# Patient Record
Sex: MALE | Race: BLACK OR AFRICAN AMERICAN | NOT HISPANIC OR LATINO | Employment: UNEMPLOYED | URBAN - METROPOLITAN AREA
[De-identification: names, ages, dates, MRNs, and addresses within clinical notes are randomized per-mention and may not be internally consistent; named-entity substitution may affect disease eponyms.]

---

## 2021-08-27 ENCOUNTER — PREP FOR SURGERY (OUTPATIENT)
Dept: OTHER | Facility: HOSPITAL | Age: 66
End: 2021-08-27

## 2021-08-27 ENCOUNTER — OFFICE VISIT (OUTPATIENT)
Dept: NEUROSURGERY | Facility: CLINIC | Age: 66
End: 2021-08-27

## 2021-08-27 VITALS
HEART RATE: 69 BPM | DIASTOLIC BLOOD PRESSURE: 106 MMHG | WEIGHT: 181 LBS | SYSTOLIC BLOOD PRESSURE: 162 MMHG | BODY MASS INDEX: 29.09 KG/M2 | HEIGHT: 66 IN

## 2021-08-27 DIAGNOSIS — M50.30 DDD (DEGENERATIVE DISC DISEASE), CERVICAL: ICD-10-CM

## 2021-08-27 DIAGNOSIS — M48.02 CERVICAL STENOSIS OF SPINE: ICD-10-CM

## 2021-08-27 DIAGNOSIS — M54.12 CERVICAL RADICULOPATHY: Primary | ICD-10-CM

## 2021-08-27 DIAGNOSIS — M50.20 CERVICAL DISC HERNIATION: ICD-10-CM

## 2021-08-27 PROCEDURE — 99205 OFFICE O/P NEW HI 60 MIN: CPT | Performed by: NEUROLOGICAL SURGERY

## 2021-08-27 RX ORDER — BUDESONIDE AND FORMOTEROL FUMARATE DIHYDRATE 160; 4.5 UG/1; UG/1
2 AEROSOL RESPIRATORY (INHALATION)
COMMUNITY

## 2021-08-27 RX ORDER — DORZOLAMIDE HCL 20 MG/ML
1 SOLUTION/ DROPS OPHTHALMIC 3 TIMES DAILY
COMMUNITY

## 2021-08-27 RX ORDER — LISINOPRIL 40 MG/1
40 TABLET ORAL DAILY
COMMUNITY

## 2021-08-27 RX ORDER — OMEPRAZOLE 20 MG/1
20 CAPSULE, DELAYED RELEASE ORAL DAILY
COMMUNITY

## 2021-08-27 RX ORDER — ALBUTEROL SULFATE 90 UG/1
2 AEROSOL, METERED RESPIRATORY (INHALATION) EVERY 4 HOURS PRN
COMMUNITY

## 2021-08-27 RX ORDER — CYANOCOBALAMIN 1000 UG/ML
1000 INJECTION, SOLUTION INTRAMUSCULAR; SUBCUTANEOUS
COMMUNITY

## 2021-08-27 RX ORDER — HYDROCHLOROTHIAZIDE 25 MG/1
25 TABLET ORAL DAILY
COMMUNITY

## 2021-08-27 NOTE — PROGRESS NOTES
Subjective   History of Present Illness: Justo Finnegan is a 65 y.o. male with a months to years history of neck pain and upper extremity pain that is progressed over the course of time.  Patient said that he began noticing weakness primarily in his right upper extremity as well as numbness.  He said approximately month ago he noticed that he was having severe difficulty lifting anything with his right upper extremity due to the weakness.  He notes severe weakness in his right upper extremity primarily with lifting his arm using his bicep and tricep.  He has also noticed some weakness in the left side but much less severe.  He has also noticed worsening numbness in his arms and hands bilaterally.  Additionally patient has pain that shoots from his neck down his arm and into his hand.  He is unclear on exactly where the pain goes in his hand.  This happens bilaterally.  Worse on the right.  Patient is undergone physical therapy but he feels it made things worse.  No injections.  No bowel or bladder issues.  Patient denies any lower extremity issues or balance issues.        The following portions of the patient's history were reviewed and updated as appropriate: allergies, current medications, past family history, past medical history, past social history, past surgical history and problem list.    Review of Systems   Constitutional: Negative for chills, fatigue and fever.   HENT: Negative for congestion, postnasal drip, sinus pressure and sinus pain.    Eyes: Negative for photophobia, pain and visual disturbance.   Respiratory: Negative for cough, chest tightness, shortness of breath and wheezing.    Cardiovascular: Negative for chest pain and palpitations.   Gastrointestinal: Negative for abdominal pain, constipation, diarrhea and nausea.   Genitourinary: Negative for difficulty urinating, flank pain and testicular pain.   Musculoskeletal: Positive for neck pain and neck stiffness.   Skin: Negative for rash.    Neurological: Negative for seizures, syncope and speech difficulty.   Psychiatric/Behavioral: Negative for agitation, behavioral problems and confusion.       Objective     .There were no vitals taken for this visit.   There is no height or weight on file to calculate BMI.      Neurologic Exam     Mental Status   Oriented to person, place, and time.     Cranial Nerves   Extraocular movements intact  No tongue fasciculations     Motor Exam     Strength   Strength 5/5 except as noted. Left upper extremity: 4+ deltoid, 4+ bicep  Right upper extremity: 4 deltoid, 4 - bicep, 4 tricep, 4 wrist extension, mild decrease in  strength     Sensory Exam   Decreased sensation C5, 6 and 7 dermatomes right   Decreased sensation C5 and C6 dermatomes left     Gait, Coordination, and Reflexes     Reflexes   Right biceps: 0  Right triceps: 0  Right Bradford: absent  Left Bradford: absent      Assessment/Plan   Independent Review of Radiographic Studies:      I personally reviewed and interpreted the images from the following studies.    MRI cervical spine: Moderate to severe degenerative changes from C4-C7 with severe foraminal stenosis bilaterally right worse than left.  Mild to moderate central stenosis C4-6, moderate central stenosis C6 7.      Cervical x-ray: Redemonstration of degenerative disc disease.  No spondylolisthesis    Medical Decision Making:      Justo Finnegan is a 65 y.o. male with a history of progressive radicular symptoms that have significantly worsened over the course of the last several months to the point that he is severely weak in involved myotomes of the right upper extremity.  MRI correlates well with his symptoms given the severe foraminal stenosis with nerve compression of the C5, C6 and C7 nerve roots.  Given the patient's severe radiculopathy and weakness, surgery is indicated and we will proceed as soon as possible given his progressive weakness due to nerve compression.  Specifically, I  recommend a C4-C7 ACDF.  Patient understands our significant risks of surgery including spinal cord injury or pseudoarthrosis.  However given his progressive weakness, benefits outweigh the risks.      There are no diagnoses linked to this encounter.  No follow-ups on file.    This patient was examined wearing appropriate personal protective equipment.     Justo Finnegan  has no history on file for tobacco use.. I have educated him on the risk of diseases from using tobacco products such as cancer, COPD and heart disease.     I advised him to quit and he is willing to quit. We have discussed the following method/s for tobacco cessation:  [place].  Together we have set a quit date for Today in order to proceed with surgery.    I spent 5 minutes counseling the patient.                  Wes Mazariegos MA  08/27/21  08:10 EDT

## 2021-08-30 PROBLEM — M54.12 CERVICAL RADICULOPATHY: Status: ACTIVE | Noted: 2021-08-30

## 2021-08-30 RX ORDER — NAPROXEN SODIUM 220 MG
220 TABLET ORAL 2 TIMES DAILY PRN
Status: ON HOLD | COMMUNITY
End: 2021-09-07

## 2021-08-31 ENCOUNTER — HOSPITAL ENCOUNTER (OUTPATIENT)
Dept: GENERAL RADIOLOGY | Facility: HOSPITAL | Age: 66
Discharge: HOME | End: 2021-08-31

## 2021-08-31 ENCOUNTER — LAB (OUTPATIENT)
Dept: LAB | Facility: HOSPITAL | Age: 66
End: 2021-08-31

## 2021-08-31 ENCOUNTER — HOSPITAL ENCOUNTER (OUTPATIENT)
Dept: CARDIOLOGY | Facility: HOSPITAL | Age: 66
Discharge: HOME | End: 2021-08-31

## 2021-08-31 DIAGNOSIS — M54.12 CERVICAL RADICULOPATHY: ICD-10-CM

## 2021-08-31 LAB
ABO GROUP BLD: NORMAL
ANION GAP SERPL CALCULATED.3IONS-SCNC: 10.5 MMOL/L (ref 5–15)
BASOPHILS # BLD AUTO: 0.1 10*3/MM3 (ref 0–0.2)
BASOPHILS NFR BLD AUTO: 1.5 % (ref 0–1.5)
BILIRUB UR QL STRIP: NEGATIVE
BLD GP AB SCN SERPL QL: NEGATIVE
BUN SERPL-MCNC: 12 MG/DL (ref 8–23)
BUN/CREAT SERPL: 9.4 (ref 7–25)
CALCIUM SPEC-SCNC: 9.4 MG/DL (ref 8.6–10.5)
CHLORIDE SERPL-SCNC: 98 MMOL/L (ref 98–107)
CLARITY UR: CLEAR
CO2 SERPL-SCNC: 28.5 MMOL/L (ref 22–29)
COLOR UR: YELLOW
CREAT SERPL-MCNC: 1.28 MG/DL (ref 0.76–1.27)
DEPRECATED RDW RBC AUTO: 37.4 FL (ref 37–54)
EOSINOPHIL # BLD AUTO: 0.14 10*3/MM3 (ref 0–0.4)
EOSINOPHIL NFR BLD AUTO: 2.2 % (ref 0.3–6.2)
ERYTHROCYTE [DISTWIDTH] IN BLOOD BY AUTOMATED COUNT: 12.8 % (ref 12.3–15.4)
GFR SERPL CREATININE-BSD FRML MDRD: 69 ML/MIN/1.73
GLUCOSE SERPL-MCNC: 111 MG/DL (ref 65–99)
GLUCOSE UR STRIP-MCNC: NEGATIVE MG/DL
HCT VFR BLD AUTO: 51.1 % (ref 37.5–51)
HGB BLD-MCNC: 17.9 G/DL (ref 13–17.7)
HGB UR QL STRIP.AUTO: NEGATIVE
IMM GRANULOCYTES # BLD AUTO: 0.02 10*3/MM3 (ref 0–0.05)
IMM GRANULOCYTES NFR BLD AUTO: 0.3 % (ref 0–0.5)
INR PPP: 1.03 (ref 0.93–1.1)
KETONES UR QL STRIP: NEGATIVE
LEUKOCYTE ESTERASE UR QL STRIP.AUTO: NEGATIVE
LYMPHOCYTES # BLD AUTO: 1.96 10*3/MM3 (ref 0.7–3.1)
LYMPHOCYTES NFR BLD AUTO: 30.3 % (ref 19.6–45.3)
MCH RBC QN AUTO: 28.5 PG (ref 26.6–33)
MCHC RBC AUTO-ENTMCNC: 35 G/DL (ref 31.5–35.7)
MCV RBC AUTO: 81.4 FL (ref 79–97)
MONOCYTES # BLD AUTO: 0.73 10*3/MM3 (ref 0.1–0.9)
MONOCYTES NFR BLD AUTO: 11.3 % (ref 5–12)
MRSA DNA SPEC QL NAA+PROBE: NORMAL
NEUTROPHILS NFR BLD AUTO: 3.51 10*3/MM3 (ref 1.7–7)
NEUTROPHILS NFR BLD AUTO: 54.4 % (ref 42.7–76)
NITRITE UR QL STRIP: NEGATIVE
NRBC BLD AUTO-RTO: 0 /100 WBC (ref 0–0.2)
PH UR STRIP.AUTO: 6 [PH] (ref 5–8)
PLATELET # BLD AUTO: 314 10*3/MM3 (ref 140–450)
PMV BLD AUTO: 9.5 FL (ref 6–12)
POTASSIUM SERPL-SCNC: 3.9 MMOL/L (ref 3.5–5.2)
PROT UR QL STRIP: NEGATIVE
PROTHROMBIN TIME: 11.4 SECONDS (ref 9.6–11.7)
QT INTERVAL: 396 MS
RBC # BLD AUTO: 6.28 10*6/MM3 (ref 4.14–5.8)
RH BLD: POSITIVE
SODIUM SERPL-SCNC: 137 MMOL/L (ref 136–145)
SP GR UR STRIP: 1.02 (ref 1–1.03)
T&S EXPIRATION DATE: NORMAL
UROBILINOGEN UR QL STRIP: NORMAL
WBC # BLD AUTO: 6.46 10*3/MM3 (ref 3.4–10.8)

## 2021-08-31 PROCEDURE — 80048 BASIC METABOLIC PNL TOTAL CA: CPT

## 2021-08-31 PROCEDURE — 85610 PROTHROMBIN TIME: CPT

## 2021-08-31 PROCEDURE — 93010 ELECTROCARDIOGRAM REPORT: CPT | Performed by: INTERNAL MEDICINE

## 2021-08-31 PROCEDURE — 86850 RBC ANTIBODY SCREEN: CPT

## 2021-08-31 PROCEDURE — 86900 BLOOD TYPING SEROLOGIC ABO: CPT

## 2021-08-31 PROCEDURE — 86901 BLOOD TYPING SEROLOGIC RH(D): CPT

## 2021-08-31 PROCEDURE — 93005 ELECTROCARDIOGRAM TRACING: CPT | Performed by: NEUROLOGICAL SURGERY

## 2021-08-31 PROCEDURE — 71046 X-RAY EXAM CHEST 2 VIEWS: CPT

## 2021-08-31 PROCEDURE — 36415 COLL VENOUS BLD VENIPUNCTURE: CPT

## 2021-08-31 PROCEDURE — 87641 MR-STAPH DNA AMP PROBE: CPT

## 2021-08-31 PROCEDURE — 81003 URINALYSIS AUTO W/O SCOPE: CPT

## 2021-08-31 PROCEDURE — 85025 COMPLETE CBC W/AUTO DIFF WBC: CPT

## 2021-09-07 ENCOUNTER — ANESTHESIA (OUTPATIENT)
Dept: PERIOP | Facility: HOSPITAL | Age: 66
End: 2021-09-07

## 2021-09-07 ENCOUNTER — HOSPITAL ENCOUNTER (OUTPATIENT)
Facility: HOSPITAL | Age: 66
Discharge: HOME | End: 2021-09-08
Attending: NEUROLOGICAL SURGERY | Admitting: HOSPITALIST

## 2021-09-07 ENCOUNTER — ANESTHESIA EVENT (OUTPATIENT)
Dept: PERIOP | Facility: HOSPITAL | Age: 66
End: 2021-09-07

## 2021-09-07 ENCOUNTER — APPOINTMENT (OUTPATIENT)
Dept: GENERAL RADIOLOGY | Facility: HOSPITAL | Age: 66
End: 2021-09-07

## 2021-09-07 ENCOUNTER — LAB (OUTPATIENT)
Dept: LAB | Facility: HOSPITAL | Age: 66
End: 2021-09-07

## 2021-09-07 DIAGNOSIS — M54.12 CERVICAL RADICULOPATHY: ICD-10-CM

## 2021-09-07 LAB — GLUCOSE BLDC GLUCOMTR-MCNC: 105 MG/DL (ref 70–105)

## 2021-09-07 PROCEDURE — 25010000002 HYDROMORPHONE PER 4 MG: Performed by: ANESTHESIOLOGY

## 2021-09-07 PROCEDURE — 25010000002 SUCCINYLCHOLINE PER 20 MG: Performed by: NURSE ANESTHETIST, CERTIFIED REGISTERED

## 2021-09-07 PROCEDURE — 25010000002 DEXAMETHASONE PER 1 MG: Performed by: NURSE ANESTHETIST, CERTIFIED REGISTERED

## 2021-09-07 PROCEDURE — C1713 ANCHOR/SCREW BN/BN,TIS/BN: HCPCS | Performed by: NEUROLOGICAL SURGERY

## 2021-09-07 PROCEDURE — G0378 HOSPITAL OBSERVATION PER HR: HCPCS

## 2021-09-07 PROCEDURE — 25010000002 CYANOCOBALAMIN PER 1000 MCG: Performed by: NEUROLOGICAL SURGERY

## 2021-09-07 PROCEDURE — 99214 OFFICE O/P EST MOD 30 MIN: CPT | Performed by: HOSPITALIST

## 2021-09-07 PROCEDURE — 82962 GLUCOSE BLOOD TEST: CPT

## 2021-09-07 PROCEDURE — 25010000002 FENTANYL CITRATE (PF) 100 MCG/2ML SOLUTION: Performed by: NURSE ANESTHETIST, CERTIFIED REGISTERED

## 2021-09-07 PROCEDURE — C1889 IMPLANT/INSERT DEVICE, NOC: HCPCS | Performed by: NEUROLOGICAL SURGERY

## 2021-09-07 PROCEDURE — 25010000002 HYDROMORPHONE PER 4 MG: Performed by: NEUROLOGICAL SURGERY

## 2021-09-07 PROCEDURE — 72040 X-RAY EXAM NECK SPINE 2-3 VW: CPT

## 2021-09-07 PROCEDURE — 22845 INSERT SPINE FIXATION DEVICE: CPT | Performed by: NEUROLOGICAL SURGERY

## 2021-09-07 PROCEDURE — 22551 ARTHRD ANT NTRBDY CERVICAL: CPT | Performed by: NEUROLOGICAL SURGERY

## 2021-09-07 PROCEDURE — 25010000002 HYDROMORPHONE PER 4 MG: Performed by: NURSE ANESTHETIST, CERTIFIED REGISTERED

## 2021-09-07 PROCEDURE — 22853 INSJ BIOMECHANICAL DEVICE: CPT | Performed by: NEUROLOGICAL SURGERY

## 2021-09-07 PROCEDURE — 25010000002 HYDRALAZINE PER 20 MG: Performed by: NURSE ANESTHETIST, CERTIFIED REGISTERED

## 2021-09-07 PROCEDURE — 25010000002 ONDANSETRON PER 1 MG: Performed by: ANESTHESIOLOGY

## 2021-09-07 PROCEDURE — 22845 INSERT SPINE FIXATION DEVICE: CPT | Performed by: SPECIALIST/TECHNOLOGIST, OTHER

## 2021-09-07 PROCEDURE — 22853 INSJ BIOMECHANICAL DEVICE: CPT | Performed by: SPECIALIST/TECHNOLOGIST, OTHER

## 2021-09-07 PROCEDURE — 94799 UNLISTED PULMONARY SVC/PX: CPT

## 2021-09-07 PROCEDURE — 25010000002 METHYLPREDNISOLONE PER 80 MG: Performed by: NEUROLOGICAL SURGERY

## 2021-09-07 PROCEDURE — 25010000002 HYDRALAZINE PER 20 MG: Performed by: NEUROLOGICAL SURGERY

## 2021-09-07 PROCEDURE — 22552 ARTHRD ANT NTRBD CERVICAL EA: CPT | Performed by: SPECIALIST/TECHNOLOGIST, OTHER

## 2021-09-07 PROCEDURE — 25010000002 PROPOFOL 10 MG/ML EMULSION: Performed by: NURSE ANESTHETIST, CERTIFIED REGISTERED

## 2021-09-07 PROCEDURE — 76000 FLUOROSCOPY <1 HR PHYS/QHP: CPT

## 2021-09-07 PROCEDURE — 22552 ARTHRD ANT NTRBD CERVICAL EA: CPT | Performed by: NEUROLOGICAL SURGERY

## 2021-09-07 PROCEDURE — 22551 ARTHRD ANT NTRBDY CERVICAL: CPT | Performed by: SPECIALIST/TECHNOLOGIST, OTHER

## 2021-09-07 DEVICE — ALLOGRFT FIBR OSTEOAMP SELECT 2.5CC: Type: IMPLANTABLE DEVICE | Site: SPINE CERVICAL | Status: FUNCTIONAL

## 2021-09-07 DEVICE — FLOSEAL HEMOSTATIC MATRIX, 10ML
Type: IMPLANTABLE DEVICE | Site: SPINE CERVICAL | Status: FUNCTIONAL
Brand: FLOSEAL HEMOSTATIC MATRIX

## 2021-09-07 DEVICE — HEDRON C SPACER, 14X16, 7MM, 7°
Type: IMPLANTABLE DEVICE | Site: SPINE CERVICAL | Status: FUNCTIONAL
Brand: HEDRON

## 2021-09-07 DEVICE — XTEND 4.2MM FIXED ANGLE SCREW, SELF-DRILLING, 14MM
Type: IMPLANTABLE DEVICE | Site: SPINE CERVICAL | Status: FUNCTIONAL
Brand: XTEND

## 2021-09-07 DEVICE — IMPLANTABLE DEVICE: Type: IMPLANTABLE DEVICE | Site: SPINE CERVICAL | Status: FUNCTIONAL

## 2021-09-07 DEVICE — VIP 4.6MM FIXED ANGLE SCREW, SELF-DRILLING, 14MM
Type: IMPLANTABLE DEVICE | Site: SPINE CERVICAL | Status: FUNCTIONAL
Brand: VIP

## 2021-09-07 DEVICE — I-FACTOR PUTTY, 2.5CC SYRINGE
Type: IMPLANTABLE DEVICE | Site: SPINE CERVICAL | Status: FUNCTIONAL
Brand: I-FACTOR PEPTIDE ENHANCED BONE GRAFT

## 2021-09-07 DEVICE — XTEND ANTERIOR CERVICAL PLATE, 3-LEVEL, 48MM
Type: IMPLANTABLE DEVICE | Site: SPINE CERVICAL | Status: FUNCTIONAL
Brand: XTEND

## 2021-09-07 RX ORDER — METHYLPREDNISOLONE ACETATE 80 MG/ML
INJECTION, SUSPENSION INTRA-ARTICULAR; INTRALESIONAL; INTRAMUSCULAR; SOFT TISSUE AS NEEDED
Status: DISCONTINUED | OUTPATIENT
Start: 2021-09-07 | End: 2021-09-07 | Stop reason: HOSPADM

## 2021-09-07 RX ORDER — SODIUM CHLORIDE 0.9 % (FLUSH) 0.9 %
10 SYRINGE (ML) INJECTION AS NEEDED
Status: DISCONTINUED | OUTPATIENT
Start: 2021-09-07 | End: 2021-09-07 | Stop reason: HOSPADM

## 2021-09-07 RX ORDER — EPHEDRINE SULFATE 50 MG/ML
INJECTION INTRAVENOUS AS NEEDED
Status: DISCONTINUED | OUTPATIENT
Start: 2021-09-07 | End: 2021-09-07 | Stop reason: SURG

## 2021-09-07 RX ORDER — HYDRALAZINE HYDROCHLORIDE 20 MG/ML
5 INJECTION INTRAMUSCULAR; INTRAVENOUS
Status: DISCONTINUED | OUTPATIENT
Start: 2021-09-07 | End: 2021-09-08 | Stop reason: HOSPADM

## 2021-09-07 RX ORDER — HYDROMORPHONE HCL 110MG/55ML
PATIENT CONTROLLED ANALGESIA SYRINGE INTRAVENOUS AS NEEDED
Status: DISCONTINUED | OUTPATIENT
Start: 2021-09-07 | End: 2021-09-07 | Stop reason: SURG

## 2021-09-07 RX ORDER — ALBUTEROL SULFATE 90 UG/1
2 AEROSOL, METERED RESPIRATORY (INHALATION) EVERY 4 HOURS PRN
Status: DISCONTINUED | OUTPATIENT
Start: 2021-09-07 | End: 2021-09-08 | Stop reason: HOSPADM

## 2021-09-07 RX ORDER — LIDOCAINE HYDROCHLORIDE 20 MG/ML
INJECTION, SOLUTION EPIDURAL; INFILTRATION; INTRACAUDAL; PERINEURAL AS NEEDED
Status: DISCONTINUED | OUTPATIENT
Start: 2021-09-07 | End: 2021-09-07 | Stop reason: SURG

## 2021-09-07 RX ORDER — ONDANSETRON 2 MG/ML
INJECTION INTRAMUSCULAR; INTRAVENOUS AS NEEDED
Status: DISCONTINUED | OUTPATIENT
Start: 2021-09-07 | End: 2021-09-07 | Stop reason: SURG

## 2021-09-07 RX ORDER — SODIUM CHLORIDE 9 MG/ML
100 INJECTION, SOLUTION INTRAVENOUS CONTINUOUS
Status: DISCONTINUED | OUTPATIENT
Start: 2021-09-07 | End: 2021-09-08 | Stop reason: HOSPADM

## 2021-09-07 RX ORDER — CYCLOBENZAPRINE HCL 10 MG
10 TABLET ORAL 3 TIMES DAILY PRN
Status: DISCONTINUED | OUTPATIENT
Start: 2021-09-07 | End: 2021-09-08 | Stop reason: HOSPADM

## 2021-09-07 RX ORDER — PROMETHAZINE HYDROCHLORIDE 25 MG/1
25 TABLET ORAL ONCE AS NEEDED
Status: COMPLETED | OUTPATIENT
Start: 2021-09-07 | End: 2021-09-08

## 2021-09-07 RX ORDER — CELECOXIB 200 MG/1
200 CAPSULE ORAL ONCE
Status: COMPLETED | OUTPATIENT
Start: 2021-09-07 | End: 2021-09-07

## 2021-09-07 RX ORDER — LISINOPRIL 20 MG/1
40 TABLET ORAL DAILY
Status: DISCONTINUED | OUTPATIENT
Start: 2021-09-07 | End: 2021-09-08 | Stop reason: HOSPADM

## 2021-09-07 RX ORDER — ACETAMINOPHEN 500 MG
500 TABLET ORAL ONCE
Status: COMPLETED | OUTPATIENT
Start: 2021-09-07 | End: 2021-09-07

## 2021-09-07 RX ORDER — LIDOCAINE HYDROCHLORIDE AND EPINEPHRINE 10; 10 MG/ML; UG/ML
INJECTION, SOLUTION INFILTRATION; PERINEURAL AS NEEDED
Status: DISCONTINUED | OUTPATIENT
Start: 2021-09-07 | End: 2021-09-07 | Stop reason: HOSPADM

## 2021-09-07 RX ORDER — DORZOLAMIDE HCL 20 MG/ML
1 SOLUTION/ DROPS OPHTHALMIC 3 TIMES DAILY
Status: DISCONTINUED | OUTPATIENT
Start: 2021-09-07 | End: 2021-09-08 | Stop reason: HOSPADM

## 2021-09-07 RX ORDER — DEXAMETHASONE SODIUM PHOSPHATE 4 MG/ML
INJECTION, SOLUTION INTRA-ARTICULAR; INTRALESIONAL; INTRAMUSCULAR; INTRAVENOUS; SOFT TISSUE AS NEEDED
Status: DISCONTINUED | OUTPATIENT
Start: 2021-09-07 | End: 2021-09-07 | Stop reason: SURG

## 2021-09-07 RX ORDER — OXYCODONE HYDROCHLORIDE 5 MG/1
10 TABLET ORAL ONCE
Status: COMPLETED | OUTPATIENT
Start: 2021-09-07 | End: 2021-09-07

## 2021-09-07 RX ORDER — PROMETHAZINE HYDROCHLORIDE 25 MG/1
25 SUPPOSITORY RECTAL ONCE AS NEEDED
Status: COMPLETED | OUTPATIENT
Start: 2021-09-07 | End: 2021-09-08

## 2021-09-07 RX ORDER — FENTANYL CITRATE 50 UG/ML
50 INJECTION, SOLUTION INTRAMUSCULAR; INTRAVENOUS
Status: DISCONTINUED | OUTPATIENT
Start: 2021-09-07 | End: 2021-09-08 | Stop reason: HOSPADM

## 2021-09-07 RX ORDER — PANTOPRAZOLE SODIUM 40 MG/1
40 TABLET, DELAYED RELEASE ORAL EVERY MORNING
Status: DISCONTINUED | OUTPATIENT
Start: 2021-09-08 | End: 2021-09-08 | Stop reason: HOSPADM

## 2021-09-07 RX ORDER — HYDROMORPHONE HCL 110MG/55ML
0.5 PATIENT CONTROLLED ANALGESIA SYRINGE INTRAVENOUS
Status: DISCONTINUED | OUTPATIENT
Start: 2021-09-07 | End: 2021-09-08 | Stop reason: HOSPADM

## 2021-09-07 RX ORDER — FENTANYL CITRATE 50 UG/ML
INJECTION, SOLUTION INTRAMUSCULAR; INTRAVENOUS AS NEEDED
Status: DISCONTINUED | OUTPATIENT
Start: 2021-09-07 | End: 2021-09-07 | Stop reason: SURG

## 2021-09-07 RX ORDER — ACETAMINOPHEN 325 MG/1
650 TABLET ORAL EVERY 8 HOURS
Status: DISCONTINUED | OUTPATIENT
Start: 2021-09-07 | End: 2021-09-08 | Stop reason: HOSPADM

## 2021-09-07 RX ORDER — LABETALOL HYDROCHLORIDE 5 MG/ML
5 INJECTION, SOLUTION INTRAVENOUS
Status: DISCONTINUED | OUTPATIENT
Start: 2021-09-07 | End: 2021-09-08 | Stop reason: HOSPADM

## 2021-09-07 RX ORDER — SODIUM CHLORIDE AND POTASSIUM CHLORIDE 150; 450 MG/100ML; MG/100ML
100 INJECTION, SOLUTION INTRAVENOUS CONTINUOUS
Status: DISCONTINUED | OUTPATIENT
Start: 2021-09-07 | End: 2021-09-07

## 2021-09-07 RX ORDER — SUCCINYLCHOLINE CHLORIDE 20 MG/ML
INJECTION INTRAMUSCULAR; INTRAVENOUS AS NEEDED
Status: DISCONTINUED | OUTPATIENT
Start: 2021-09-07 | End: 2021-09-07 | Stop reason: SURG

## 2021-09-07 RX ORDER — GABAPENTIN 300 MG/1
300 CAPSULE ORAL ONCE
Status: COMPLETED | OUTPATIENT
Start: 2021-09-07 | End: 2021-09-07

## 2021-09-07 RX ORDER — SODIUM CHLORIDE 0.9 % (FLUSH) 0.9 %
10 SYRINGE (ML) INJECTION AS NEEDED
Status: DISCONTINUED | OUTPATIENT
Start: 2021-09-07 | End: 2021-09-08 | Stop reason: HOSPADM

## 2021-09-07 RX ORDER — HYDROCHLOROTHIAZIDE 25 MG/1
25 TABLET ORAL DAILY
Status: DISCONTINUED | OUTPATIENT
Start: 2021-09-07 | End: 2021-09-08 | Stop reason: HOSPADM

## 2021-09-07 RX ORDER — ONDANSETRON 2 MG/ML
4 INJECTION INTRAMUSCULAR; INTRAVENOUS EVERY 6 HOURS PRN
Status: DISCONTINUED | OUTPATIENT
Start: 2021-09-07 | End: 2021-09-08 | Stop reason: HOSPADM

## 2021-09-07 RX ORDER — ONDANSETRON 2 MG/ML
4 INJECTION INTRAMUSCULAR; INTRAVENOUS ONCE AS NEEDED
Status: DISCONTINUED | OUTPATIENT
Start: 2021-09-07 | End: 2021-09-08 | Stop reason: HOSPADM

## 2021-09-07 RX ORDER — CYANOCOBALAMIN 1000 UG/ML
1000 INJECTION, SOLUTION INTRAMUSCULAR; SUBCUTANEOUS
Status: DISCONTINUED | OUTPATIENT
Start: 2021-09-07 | End: 2021-09-08 | Stop reason: HOSPADM

## 2021-09-07 RX ORDER — PROPOFOL 10 MG/ML
VIAL (ML) INTRAVENOUS AS NEEDED
Status: DISCONTINUED | OUTPATIENT
Start: 2021-09-07 | End: 2021-09-07 | Stop reason: SURG

## 2021-09-07 RX ORDER — SODIUM CHLORIDE 0.9 % (FLUSH) 0.9 %
3 SYRINGE (ML) INJECTION EVERY 12 HOURS SCHEDULED
Status: DISCONTINUED | OUTPATIENT
Start: 2021-09-07 | End: 2021-09-08 | Stop reason: HOSPADM

## 2021-09-07 RX ORDER — NALOXONE HCL 0.4 MG/ML
0.4 VIAL (ML) INJECTION AS NEEDED
Status: DISCONTINUED | OUTPATIENT
Start: 2021-09-07 | End: 2021-09-08 | Stop reason: HOSPADM

## 2021-09-07 RX ORDER — SODIUM CHLORIDE, SODIUM LACTATE, POTASSIUM CHLORIDE, CALCIUM CHLORIDE 600; 310; 30; 20 MG/100ML; MG/100ML; MG/100ML; MG/100ML
1000 INJECTION, SOLUTION INTRAVENOUS CONTINUOUS
Status: DISCONTINUED | OUTPATIENT
Start: 2021-09-07 | End: 2021-09-07

## 2021-09-07 RX ORDER — HYDROCODONE BITARTRATE AND ACETAMINOPHEN 5; 325 MG/1; MG/1
1 TABLET ORAL EVERY 4 HOURS PRN
Status: DISCONTINUED | OUTPATIENT
Start: 2021-09-07 | End: 2021-09-08 | Stop reason: HOSPADM

## 2021-09-07 RX ORDER — BUDESONIDE AND FORMOTEROL FUMARATE DIHYDRATE 160; 4.5 UG/1; UG/1
2 AEROSOL RESPIRATORY (INHALATION)
Status: DISCONTINUED | OUTPATIENT
Start: 2021-09-07 | End: 2021-09-08 | Stop reason: HOSPADM

## 2021-09-07 RX ADMIN — HYDRALAZINE HYDROCHLORIDE 5 MG: 20 INJECTION INTRAMUSCULAR; INTRAVENOUS at 17:24

## 2021-09-07 RX ADMIN — HYDROCODONE BITARTRATE AND ACETAMINOPHEN 1 TABLET: 5; 325 TABLET ORAL at 20:58

## 2021-09-07 RX ADMIN — CEFAZOLIN SODIUM 2 G: 1 INJECTION, POWDER, FOR SOLUTION INTRAMUSCULAR; INTRAVENOUS at 13:09

## 2021-09-07 RX ADMIN — DORZOLAMIDE HYDROCHLORIDE 1 DROP: 20 SOLUTION/ DROPS OPHTHALMIC at 20:33

## 2021-09-07 RX ADMIN — OXYCODONE HYDROCHLORIDE 10 MG: 5 TABLET ORAL at 12:15

## 2021-09-07 RX ADMIN — PROPOFOL 200 MG: 10 INJECTION, EMULSION INTRAVENOUS at 13:01

## 2021-09-07 RX ADMIN — SUCCINYLCHOLINE CHLORIDE 100 MG: 20 INJECTION INTRAMUSCULAR; INTRAVENOUS at 13:01

## 2021-09-07 RX ADMIN — PROPOFOL 150 MCG/KG/MIN: 10 INJECTION, EMULSION INTRAVENOUS at 13:03

## 2021-09-07 RX ADMIN — EPHEDRINE SULFATE 5 MG: 50 INJECTION INTRAVENOUS at 13:31

## 2021-09-07 RX ADMIN — EPHEDRINE SULFATE 5 MG: 50 INJECTION INTRAVENOUS at 14:08

## 2021-09-07 RX ADMIN — EPHEDRINE SULFATE 10 MG: 50 INJECTION INTRAVENOUS at 13:15

## 2021-09-07 RX ADMIN — HYDROMORPHONE HYDROCHLORIDE 0.5 MG: 2 INJECTION, SOLUTION INTRAMUSCULAR; INTRAVENOUS; SUBCUTANEOUS at 16:56

## 2021-09-07 RX ADMIN — HYDRALAZINE HYDROCHLORIDE 5 MG: 20 INJECTION INTRAMUSCULAR; INTRAVENOUS at 17:12

## 2021-09-07 RX ADMIN — FENTANYL CITRATE 50 MCG: 50 INJECTION, SOLUTION INTRAMUSCULAR; INTRAVENOUS at 13:01

## 2021-09-07 RX ADMIN — HYDROMORPHONE HYDROCHLORIDE 0.5 MG: 2 INJECTION, SOLUTION INTRAMUSCULAR; INTRAVENOUS; SUBCUTANEOUS at 17:15

## 2021-09-07 RX ADMIN — FENTANYL CITRATE 50 MCG: 50 INJECTION, SOLUTION INTRAMUSCULAR; INTRAVENOUS at 13:23

## 2021-09-07 RX ADMIN — ONDANSETRON 4 MG: 2 INJECTION INTRAMUSCULAR; INTRAVENOUS at 15:41

## 2021-09-07 RX ADMIN — CYANOCOBALAMIN 1000 MCG: 1000 INJECTION, SOLUTION INTRAMUSCULAR at 20:33

## 2021-09-07 RX ADMIN — EPHEDRINE SULFATE 5 MG: 50 INJECTION INTRAVENOUS at 13:55

## 2021-09-07 RX ADMIN — CELECOXIB 200 MG: 200 CAPSULE ORAL at 12:15

## 2021-09-07 RX ADMIN — ACETAMINOPHEN 500 MG: 500 TABLET, FILM COATED ORAL at 12:15

## 2021-09-07 RX ADMIN — SODIUM CHLORIDE, POTASSIUM CHLORIDE, SODIUM LACTATE AND CALCIUM CHLORIDE: 600; 310; 30; 20 INJECTION, SOLUTION INTRAVENOUS at 14:48

## 2021-09-07 RX ADMIN — SODIUM CHLORIDE 100 ML/HR: 9 INJECTION, SOLUTION INTRAVENOUS at 18:58

## 2021-09-07 RX ADMIN — ACETAMINOPHEN 650 MG: 325 TABLET, FILM COATED ORAL at 20:33

## 2021-09-07 RX ADMIN — GABAPENTIN 300 MG: 300 CAPSULE ORAL at 12:15

## 2021-09-07 RX ADMIN — SODIUM CHLORIDE, POTASSIUM CHLORIDE, SODIUM LACTATE AND CALCIUM CHLORIDE: 600; 310; 30; 20 INJECTION, SOLUTION INTRAVENOUS at 12:56

## 2021-09-07 RX ADMIN — HYDROMORPHONE HYDROCHLORIDE 0.5 MG: 2 INJECTION INTRAMUSCULAR; INTRAVENOUS; SUBCUTANEOUS at 16:00

## 2021-09-07 RX ADMIN — BUDESONIDE AND FORMOTEROL FUMARATE DIHYDRATE 2 PUFF: 160; 4.5 AEROSOL RESPIRATORY (INHALATION) at 20:10

## 2021-09-07 RX ADMIN — DEXAMETHASONE SODIUM PHOSPHATE 4 MG: 4 INJECTION, SOLUTION INTRAMUSCULAR; INTRAVENOUS at 13:13

## 2021-09-07 RX ADMIN — SODIUM CHLORIDE, POTASSIUM CHLORIDE, SODIUM LACTATE AND CALCIUM CHLORIDE 1000 ML: 600; 310; 30; 20 INJECTION, SOLUTION INTRAVENOUS at 10:41

## 2021-09-07 RX ADMIN — LISINOPRIL 40 MG: 20 TABLET ORAL at 20:33

## 2021-09-07 RX ADMIN — HYDROMORPHONE HYDROCHLORIDE 0.5 MG: 2 INJECTION, SOLUTION INTRAMUSCULAR; INTRAVENOUS; SUBCUTANEOUS at 16:16

## 2021-09-07 RX ADMIN — HYDROMORPHONE HYDROCHLORIDE 0.5 MG: 2 INJECTION INTRAMUSCULAR; INTRAVENOUS; SUBCUTANEOUS at 15:44

## 2021-09-07 RX ADMIN — REMIFENTANIL HYDROCHLORIDE 0.2 MCG/KG/MIN: 1 INJECTION, POWDER, LYOPHILIZED, FOR SOLUTION INTRAVENOUS at 13:03

## 2021-09-07 RX ADMIN — LIDOCAINE HYDROCHLORIDE 50 MG: 20 INJECTION, SOLUTION EPIDURAL; INFILTRATION; INTRACAUDAL; PERINEURAL at 13:01

## 2021-09-07 NOTE — ANESTHESIA PROCEDURE NOTES
Airway  Urgency: elective    Date/Time: 9/7/2021 1:04 PM  Airway not difficult    General Information and Staff    Patient location during procedure: OR  Anesthesiologist: Nolberto Castillo MD  CRNA: Jenn Almonte CRNA    Indications and Patient Condition  Indications for airway management: CNS depression    Preoxygenated: yes  Mask difficulty assessment: 0 - not attempted    Final Airway Details  Final airway type: endotracheal airway      Successful airway: ETT  Cuffed: yes   Successful intubation technique: direct laryngoscopy  Facilitating devices/methods: cricoid pressure and intubating stylet  Endotracheal tube insertion site: oral  Blade: Mi  Blade size: 4  ETT size (mm): 7.5  Cormack-Lehane Classification: grade IIa - partial view of glottis (soft tissue hindered view with MAC 4 light )  Placement verified by: chest auscultation and capnometry   Measured from: gums  ETT/EBT to gums (cm): 23  Number of attempts at approach: 1  Assessment: lips, teeth, and gum same as pre-op and atraumatic intubation    Additional Comments  JUDAH Gary

## 2021-09-07 NOTE — ANESTHESIA PREPROCEDURE EVALUATION
Anesthesia Evaluation     Patient summary reviewed and Nursing notes reviewed   NPO Solid Status: > 8 hours  NPO Liquid Status: > 8 hours           Airway   Mallampati: II  TM distance: >3 FB  Neck ROM: full  No difficulty expected  Dental - normal exam     Pulmonary - normal exam    breath sounds clear to auscultation  (+) asthma,  Cardiovascular - normal exam  Exercise tolerance: unable to assess    ECG reviewed  Rhythm: regular  Rate: normal    (+) hypertension,       Neuro/Psych  (+) numbness,     GI/Hepatic/Renal/Endo - negative ROS     Musculoskeletal (-) negative ROS    Abdominal  - normal exam   Substance History - negative use     OB/GYN negative ob/gyn ROS         Other - negative ROS                       Anesthesia Plan    ASA 2     general   (ERAS, TIVA, Pills,EMG, SSEP)  intravenous induction     Anesthetic plan, all risks, benefits, and alternatives have been provided, discussed and informed consent has been obtained with: patient.

## 2021-09-07 NOTE — ANESTHESIA POSTPROCEDURE EVALUATION
Patient: Justo Finnegan    Procedure Summary     Date: 09/07/21 Room / Location: [facility] OR 12 / [facility] MAIN OR    Anesthesia Start: 1256 Anesthesia Stop: 1602    Procedure: CERVICAL DISCECTOMY ANTERIOR FUSION WITH INSTRUMENTATION C4-7 (N/A Spine Cervical) Diagnosis:       Cervical radiculopathy      (Cervical radiculopathy [M54.12])    Surgeons: Juan Manuel Azul IV, MD Provider: Nolberto Castillo MD    Anesthesia Type: general ASA Status: 2          Anesthesia Type: general    Vitals  Vitals Value Taken Time   /105 09/07/21 1634   Temp 97.7 °F (36.5 °C) 09/07/21 1602   Pulse 74 09/07/21 1634   Resp 13 09/07/21 1632   SpO2 91 % 09/07/21 1634   Vitals shown include unvalidated device data.        Post Anesthesia Care and Evaluation    Patient location during evaluation: PACU  Patient participation: complete - patient participated  Level of consciousness: awake  Pain scale: See nurse's notes for pain score.  Pain management: adequate  Airway patency: patent  Anesthetic complications: No anesthetic complications  PONV Status: none  Cardiovascular status: acceptable  Respiratory status: acceptable  Hydration status: acceptable    Comments: Patient seen and examined postoperatively; vital signs stable; SpO2 greater than or equal to 90%; cardiopulmonary status stable; nausea/vomiting adequately controlled; pain adequately controlled; no apparent anesthesia complications; patient discharged from anesthesia care when discharge criteria were met

## 2021-09-08 ENCOUNTER — APPOINTMENT (OUTPATIENT)
Dept: GENERAL RADIOLOGY | Facility: HOSPITAL | Age: 66
End: 2021-09-08

## 2021-09-08 VITALS
OXYGEN SATURATION: 98 % | BODY MASS INDEX: 28.98 KG/M2 | TEMPERATURE: 98.1 F | DIASTOLIC BLOOD PRESSURE: 87 MMHG | SYSTOLIC BLOOD PRESSURE: 146 MMHG | HEART RATE: 64 BPM | WEIGHT: 180.34 LBS | HEIGHT: 66 IN | RESPIRATION RATE: 20 BRPM

## 2021-09-08 PROBLEM — M54.12 CERVICAL RADICULOPATHY: Status: RESOLVED | Noted: 2021-08-30 | Resolved: 2021-09-08

## 2021-09-08 LAB
ANION GAP SERPL CALCULATED.3IONS-SCNC: 9 MMOL/L (ref 5–15)
BASOPHILS # BLD AUTO: 0 10*3/MM3 (ref 0–0.2)
BASOPHILS NFR BLD AUTO: 0.4 % (ref 0–1.5)
BUN SERPL-MCNC: 9 MG/DL (ref 8–23)
BUN/CREAT SERPL: 9.4 (ref 7–25)
CALCIUM SPEC-SCNC: 8.6 MG/DL (ref 8.6–10.5)
CHLORIDE SERPL-SCNC: 103 MMOL/L (ref 98–107)
CO2 SERPL-SCNC: 26 MMOL/L (ref 22–29)
CREAT SERPL-MCNC: 0.96 MG/DL (ref 0.76–1.27)
DEPRECATED RDW RBC AUTO: 40.3 FL (ref 37–54)
EOSINOPHIL # BLD AUTO: 0 10*3/MM3 (ref 0–0.4)
EOSINOPHIL NFR BLD AUTO: 0 % (ref 0.3–6.2)
ERYTHROCYTE [DISTWIDTH] IN BLOOD BY AUTOMATED COUNT: 13.7 % (ref 12.3–15.4)
GFR SERPL CREATININE-BSD FRML MDRD: 96 ML/MIN/1.73
GLUCOSE SERPL-MCNC: 98 MG/DL (ref 65–99)
HCT VFR BLD AUTO: 48.4 % (ref 37.5–51)
HGB BLD-MCNC: 16 G/DL (ref 13–17.7)
LYMPHOCYTES # BLD AUTO: 1.2 10*3/MM3 (ref 0.7–3.1)
LYMPHOCYTES NFR BLD AUTO: 10.4 % (ref 19.6–45.3)
MCH RBC QN AUTO: 28.1 PG (ref 26.6–33)
MCHC RBC AUTO-ENTMCNC: 33 G/DL (ref 31.5–35.7)
MCV RBC AUTO: 85.1 FL (ref 79–97)
MONOCYTES # BLD AUTO: 1.2 10*3/MM3 (ref 0.1–0.9)
MONOCYTES NFR BLD AUTO: 10.1 % (ref 5–12)
NEUTROPHILS NFR BLD AUTO: 79.1 % (ref 42.7–76)
NEUTROPHILS NFR BLD AUTO: 9.4 10*3/MM3 (ref 1.7–7)
NRBC BLD AUTO-RTO: 0.2 /100 WBC (ref 0–0.2)
PLATELET # BLD AUTO: 290 10*3/MM3 (ref 140–450)
PMV BLD AUTO: 7.7 FL (ref 6–12)
POTASSIUM SERPL-SCNC: 5.1 MMOL/L (ref 3.5–5.2)
RBC # BLD AUTO: 5.68 10*6/MM3 (ref 4.14–5.8)
SODIUM SERPL-SCNC: 138 MMOL/L (ref 136–145)
WBC # BLD AUTO: 11.9 10*3/MM3 (ref 3.4–10.8)

## 2021-09-08 PROCEDURE — 99217 PR OBSERVATION CARE DISCHARGE MANAGEMENT: CPT | Performed by: HOSPITALIST

## 2021-09-08 PROCEDURE — G0378 HOSPITAL OBSERVATION PER HR: HCPCS

## 2021-09-08 PROCEDURE — 85025 COMPLETE CBC W/AUTO DIFF WBC: CPT | Performed by: HOSPITALIST

## 2021-09-08 PROCEDURE — 97161 PT EVAL LOW COMPLEX 20 MIN: CPT

## 2021-09-08 PROCEDURE — 80048 BASIC METABOLIC PNL TOTAL CA: CPT | Performed by: HOSPITALIST

## 2021-09-08 PROCEDURE — 25010000002 ONDANSETRON PER 1 MG: Performed by: NEUROLOGICAL SURGERY

## 2021-09-08 PROCEDURE — 94799 UNLISTED PULMONARY SVC/PX: CPT

## 2021-09-08 PROCEDURE — 97165 OT EVAL LOW COMPLEX 30 MIN: CPT

## 2021-09-08 PROCEDURE — 63710000001 PROMETHAZINE PER 25 MG: Performed by: NEUROLOGICAL SURGERY

## 2021-09-08 PROCEDURE — 72040 X-RAY EXAM NECK SPINE 2-3 VW: CPT

## 2021-09-08 PROCEDURE — 25010000002 ENOXAPARIN PER 10 MG: Performed by: NEUROLOGICAL SURGERY

## 2021-09-08 RX ORDER — CYCLOBENZAPRINE HCL 5 MG
5 TABLET ORAL 3 TIMES DAILY PRN
Qty: 18 TABLET | Refills: 0 | Status: SHIPPED | OUTPATIENT
Start: 2021-09-08

## 2021-09-08 RX ORDER — HYDROCODONE BITARTRATE AND ACETAMINOPHEN 5; 325 MG/1; MG/1
1 TABLET ORAL EVERY 4 HOURS PRN
Qty: 12 TABLET | Refills: 0 | Status: SHIPPED | OUTPATIENT
Start: 2021-09-08 | End: 2021-09-14

## 2021-09-08 RX ADMIN — ACETAMINOPHEN 650 MG: 325 TABLET, FILM COATED ORAL at 03:45

## 2021-09-08 RX ADMIN — CYCLOBENZAPRINE 10 MG: 10 TABLET, FILM COATED ORAL at 11:48

## 2021-09-08 RX ADMIN — HYDROCODONE BITARTRATE AND ACETAMINOPHEN 1 TABLET: 5; 325 TABLET ORAL at 11:47

## 2021-09-08 RX ADMIN — ACETAMINOPHEN 650 MG: 325 TABLET, FILM COATED ORAL at 11:47

## 2021-09-08 RX ADMIN — Medication 3 ML: at 09:40

## 2021-09-08 RX ADMIN — LISINOPRIL 40 MG: 20 TABLET ORAL at 09:39

## 2021-09-08 RX ADMIN — ONDANSETRON 4 MG: 2 INJECTION INTRAMUSCULAR; INTRAVENOUS at 02:08

## 2021-09-08 RX ADMIN — DORZOLAMIDE HYDROCHLORIDE 1 DROP: 20 SOLUTION/ DROPS OPHTHALMIC at 09:39

## 2021-09-08 RX ADMIN — SODIUM CHLORIDE 100 ML/HR: 9 INJECTION, SOLUTION INTRAVENOUS at 06:29

## 2021-09-08 RX ADMIN — PROMETHAZINE HYDROCHLORIDE 25 MG: 25 TABLET ORAL at 03:45

## 2021-09-08 RX ADMIN — HYDROCODONE BITARTRATE AND ACETAMINOPHEN 1 TABLET: 5; 325 TABLET ORAL at 02:08

## 2021-09-08 RX ADMIN — PANTOPRAZOLE SODIUM 40 MG: 40 TABLET, DELAYED RELEASE ORAL at 09:39

## 2021-09-08 RX ADMIN — HYDROCHLOROTHIAZIDE 25 MG: 25 TABLET ORAL at 09:39

## 2021-09-08 RX ADMIN — BUDESONIDE AND FORMOTEROL FUMARATE DIHYDRATE 2 PUFF: 160; 4.5 AEROSOL RESPIRATORY (INHALATION) at 08:05

## 2021-09-08 RX ADMIN — ENOXAPARIN SODIUM 40 MG: 40 INJECTION SUBCUTANEOUS at 09:39

## 2021-09-16 NOTE — PROGRESS NOTES
"Subjective   History of Present Illness: Justo Finnegan is a 66 y.o. male is here today for surgical follow-up. Mr. Finnegan had C4-7 ACDF on 9/7/2021. The incision is clean and dry with no redness, drainage or swelling. The patient denies any fevers. He has some difficulty swallowing and some neck pain.        The following portions of the patient's history were reviewed and updated as appropriate: allergies, current medications, past family history, past medical history, past social history, past surgical history and problem list.    Review of Systems   Constitutional: Negative for diaphoresis, fatigue and fever.   HENT: Positive for trouble swallowing.    Respiratory: Negative for chest tightness and shortness of breath.    Cardiovascular: Negative for chest pain and palpitations.   Gastrointestinal: Negative for abdominal pain, nausea and vomiting.   Musculoskeletal: Positive for arthralgias (Right Shoulder pain) and neck pain.   Neurological: Negative for dizziness, light-headedness and headaches.       Objective     /91   Pulse 71   Temp 98.4 °F (36.9 °C)   Ht 167.6 cm (66\")   Wt 81.6 kg (180 lb)   BMI 29.05 kg/m²    Body mass index is 29.05 kg/m².      Neurologic Exam    Assessment/Plan   Independent Review of Radiographic Studies:      I personally reviewed and interpreted the images from the following studies.        Medical Decision Making:      Justo Finnegan is a 66 y.o. male       There are no diagnoses linked to this encounter.  No follow-ups on file.    This patient was examined wearing appropriate personal protective equipment.     Justo Finnegan  reports that he has quit smoking. He has never used smokeless tobacco..                   Elissa Adler MA  09/22/21  13:04 EDT  "

## 2021-09-22 ENCOUNTER — OFFICE VISIT (OUTPATIENT)
Dept: NEUROSURGERY | Facility: CLINIC | Age: 66
End: 2021-09-22

## 2021-09-22 VITALS
DIASTOLIC BLOOD PRESSURE: 91 MMHG | TEMPERATURE: 98.4 F | HEIGHT: 66 IN | HEART RATE: 71 BPM | WEIGHT: 180 LBS | SYSTOLIC BLOOD PRESSURE: 145 MMHG | BODY MASS INDEX: 28.93 KG/M2

## 2021-09-22 DIAGNOSIS — Z98.1 STATUS POST CERVICAL SPINAL FUSION: Primary | ICD-10-CM

## 2021-09-22 PROCEDURE — 99024 POSTOP FOLLOW-UP VISIT: CPT | Performed by: NEUROLOGICAL SURGERY

## 2021-09-22 RX ORDER — METHYLPREDNISOLONE 4 MG/1
TABLET ORAL
Qty: 1 EACH | Refills: 0 | Status: SHIPPED | OUTPATIENT
Start: 2021-09-22

## 2021-09-27 ENCOUNTER — TELEPHONE (OUTPATIENT)
Dept: PHYSICAL THERAPY | Facility: CLINIC | Age: 66
End: 2021-09-27

## 2021-09-30 ENCOUNTER — TREATMENT (OUTPATIENT)
Dept: PHYSICAL THERAPY | Facility: CLINIC | Age: 66
End: 2021-09-30

## 2021-09-30 DIAGNOSIS — M54.2 CERVICAL PAIN: ICD-10-CM

## 2021-09-30 DIAGNOSIS — R29.898 UPPER EXTREMITY WEAKNESS: ICD-10-CM

## 2021-09-30 DIAGNOSIS — Z98.1 S/P CERVICAL SPINAL FUSION: Primary | ICD-10-CM

## 2021-09-30 PROCEDURE — 97110 THERAPEUTIC EXERCISES: CPT | Performed by: PHYSICAL THERAPIST

## 2021-09-30 PROCEDURE — 97162 PT EVAL MOD COMPLEX 30 MIN: CPT | Performed by: PHYSICAL THERAPIST

## 2021-09-30 NOTE — PROGRESS NOTES
Physical Therapy Initial Evaluation and Plan of Care    Patient: Justo Finnegan   : 1955  Diagnosis/ICD-10 Code:  S/P cervical spinal fusion [Z98.1]  Referring practitioner: Juan Manuel Azul IV, MD  Date of Initial Visit: 2021  Today's Date: 2021  Patient seen for 1 session         ICD-10-CM ICD-9-CM   1. S/P cervical spinal fusion  Z98.1 V45.4   2. Cervical pain  M54.2 723.1   3. Upper extremity weakness  R29.898 729.89            Subjective Questionnaire: NDI:23/50 points    Subjective Evaluation    History of Present Illness  Date of surgery: 2021  Mechanism of injury: Patient presents to physical therapy with orders to address neck pain following C4-7 ACDF on 2021.  He states that he has soreness with movement and activity.  He reports intermittent headaches and some difficulty sleeping.  He states that his scar itches. He has resumed driving without significant difficulty.   He tries to focus on his mirrors so he doesn't have to turn his head very much.    Patient is retired but he is unable to take care of his chores due to his neck.  He is eager to resume yard work and cutting the grass.  He likes to be active but has been restricted following his surgery.  He feels weak in his right arm.      Patient Occupation: Retired Pain  Current pain ratin  At best pain ratin  At worst pain ratin  Location: neck  Relieving factors: rest (cervical soft collar)  Aggravating factors: repetitive movement, movement and lifting (sneeze)    Social Support  Lives with: spouse    Hand dominance: ambidextrous    Treatments  Current treatment: physical therapy  Patient Goals  Patient goals for therapy: decreased pain, increased motion, increased strength and independence with ADLs/IADLs           Objective          Palpation   Left   Hypertonic in the pectoralis minor and upper trapezius.   Tenderness of the upper trapezius.     Right   Hypertonic in the pectoralis minor and upper trapezius.  Tenderness of the upper trapezius.     Neurological Testing     Sensation   Cervical/Thoracic   Left   Intact: light touch    Right   Intact: light touch    Active Range of Motion   Cervical/Thoracic Spine   Cervical    Flexion: 35 degrees   Extension: 15 degrees   Left lateral flexion: 25 degrees   Right lateral flexion: 25 degrees   Left rotation: 32 degrees   Right rotation: 38 degrees     Strength/Myotome Testing     Left Shoulder     Planes of Motion   Flexion: 5   Abduction: 5   External rotation at 0°: 5   Internal rotation at 0°: 5     Right Shoulder     Planes of Motion   Flexion: 4+   Abduction: 5   External rotation at 0°: 5   Internal rotation at 0°: 5     Left Elbow   Flexion: 5  Extension: 5    Right Elbow   Flexion: 3+  Extension: 4+    Left Wrist/Hand   Wrist extension: 5  Wrist flexion: 5    Right Wrist/Hand   Wrist extension: 5  Wrist flexion: 5        Assessment & Plan     Assessment  Impairments: abnormal muscle tone, abnormal or restricted ROM, activity intolerance, impaired physical strength, lacks appropriate home exercise program and pain with function  Assessment details: The patient is a 66 y.o. male who presents to physical therapy today for treatment following his ACDF C4-7 on 9/7/21. Upon initial evaluation, the patient demonstrates the impairments listed above. Due to these impairments, the patient is unable to complete his house chores, yard work, and daily tasks without pain or stiffness. The patient would benefit from skilled PT services to address functional limitations and impairments and to improve patient quality of life.    Prognosis: good  Functional Limitations: carrying objects, lifting, sleeping, uncomfortable because of pain, moving in bed, sitting, reaching behind back, reaching overhead and unable to perform repetitive tasks  Goals  Plan Goals: STG's: 3 weeks   Patient will report a reduction in pain by >25%  Patient will be able to perform HEP with minimal verbal cues      LTG's: By discharge   Patient will report a reduction in pain by >75%  Patient will be able to write without increased pain  Patient will have >10% improvement on the Neck Disability Index to demonstrate overall improvement in daily functional level  Patient will be able to reach into overhead cabinet to get a dish without pain or difficulty  Patient will be independent with undergarment dressing without pain   Patient will be able to tolerate sitting > 60 minutes without increased pain  Patient will demonstrate sufficient cervical AROM to allow patient to turn his head to view blindspots when driving  Patient will be able to sleep > 5 hours without waking in pain   Patient will be independent with HEP      Plan  Therapy options: will be seen for skilled physical therapy services  Planned modality interventions: cryotherapy, electrical stimulation/Russian stimulation, TENS, thermotherapy (hydrocollator packs) and dry needling  Planned therapy interventions: body mechanics training, flexibility, functional ROM exercises, home exercise program, manual therapy, neuromuscular re-education, postural training, soft tissue mobilization, spinal/joint mobilization, strengthening and stretching  Frequency: 2x week  Duration in visits: 20  Duration in weeks: 10  Treatment plan discussed with: patient        History # of Personal Factors and/or Comorbidities: MODERATE (1-2)  Examination of Body System(s): # of elements: MODERATE (3)  Clinical Presentation: EVOLVING  Clinical Decision Making: MODERATE      Timed:         Manual Therapy:    5     mins  47479;     Therapeutic Exercise:    10     mins  22987;     Neuromuscular Tono:        mins  01821;    Therapeutic Activity:          mins  74087;     Gait Training:           mins  43146;     Ultrasound:          mins  58353;    Ionto                                   mins   03739  Self Care                            mins   06765  Canalith Repos         mins  58186      Un-Timed:  Electrical Stimulation:         mins  98485 ( );  Dry Needling          mins self-pay  Traction          mins 62900  Low Eval          Mins  96289  Mod Eval     25     Mins  49929  High Eval                            Mins  62841        Timed Treatment:   15   mins   Total Treatment:     40   mins    PT SIGNATURE: Cookie Kenroy, PT   DATE TREATMENT INITIATED: 9/30/2021    Initial Certification  Certification Period: 12/28/2021  I certify that the therapy services are furnished while this patient is under my care.  The services outlined above are required by this patient, and will be reviewed every 90 days.     PHYSICIAN: Juan Manuel Azul IV, MD      DATE:     Please sign and return via fax to [phone]. Thank you, [facility].

## 2021-10-04 ENCOUNTER — TREATMENT (OUTPATIENT)
Dept: PHYSICAL THERAPY | Facility: CLINIC | Age: 66
End: 2021-10-04

## 2021-10-04 DIAGNOSIS — M54.2 CERVICAL PAIN: ICD-10-CM

## 2021-10-04 DIAGNOSIS — Z98.1 S/P CERVICAL SPINAL FUSION: Primary | ICD-10-CM

## 2021-10-04 DIAGNOSIS — R29.898 UPPER EXTREMITY WEAKNESS: ICD-10-CM

## 2021-10-04 PROCEDURE — 97140 MANUAL THERAPY 1/> REGIONS: CPT | Performed by: PHYSICAL THERAPIST

## 2021-10-04 PROCEDURE — 97110 THERAPEUTIC EXERCISES: CPT | Performed by: PHYSICAL THERAPIST

## 2021-10-04 NOTE — PROGRESS NOTES
Physical Therapy Daily Progress Note    VISIT#: 2    Subjective   Justo Finnegan reports that he feels stiff today and has soreness mid-scapula.   He doesn't have his follow-up scheduled with the surgeon but he did get the CT scheduled for next week.   He states that he doesn't know how long he has to wear the soft collar.  He states that his pain increases when it is off.    Pain Rating (0-10): 4    Objective     See Exercise, Manual, and Modality Logs for complete treatment.     Patient Education: Follow up with MD regarding collar restrictions    Assessment & Plan     Assessment  Assessment details: Patient had significant tightness in bilateral upper traps and levator scap.   He continues with moderate loss of strength in R biceps.  He had significant fatigue with 15 reps of seated elbow flexion. After treatment, he reported fatigue and mild soreness.  He declined thermal modalities post treatment.           Progress per Plan of Care and Progress strengthening /stabilization /functional activity.  Patient is scheduled for his post-op CT scan on Monday.            Timed:         Manual Therapy:    15     mins  43048;     Therapeutic Exercise:    25     mins  67352;     Neuromuscular Tono:        mins  89383;    Therapeutic Activity:          mins  26736;     Gait Training:           mins  96585;     Ultrasound:          mins  70523;    Ionto                                   mins   40664  Self Care                            mins   17054  Canalith Repos                   mins  03777    Un-Timed:  Electrical Stimulation:         mins  79672 ( );  Dry Needling          mins self-pay  Traction          mins 63348  Low Eval          Mins  31896  Mod Eval          Mins  01738  High Eval                            Mins  87109  Re-Eval                               mins  20705    Timed Treatment:   40   mins   Total Treatment:     40   mins    Cookie Jasmine PT  Physical Therapist

## 2021-10-07 ENCOUNTER — TREATMENT (OUTPATIENT)
Dept: PHYSICAL THERAPY | Facility: CLINIC | Age: 66
End: 2021-10-07

## 2021-10-07 DIAGNOSIS — Z98.1 S/P CERVICAL SPINAL FUSION: Primary | ICD-10-CM

## 2021-10-07 DIAGNOSIS — R29.898 UPPER EXTREMITY WEAKNESS: ICD-10-CM

## 2021-10-07 DIAGNOSIS — M54.2 CERVICAL PAIN: ICD-10-CM

## 2021-10-07 PROCEDURE — 97140 MANUAL THERAPY 1/> REGIONS: CPT | Performed by: PHYSICAL THERAPIST

## 2021-10-07 PROCEDURE — 97110 THERAPEUTIC EXERCISES: CPT | Performed by: PHYSICAL THERAPIST

## 2021-10-07 NOTE — PROGRESS NOTES
Physical Therapy Daily Progress Note    VISIT#: 3    Subjective   Justo Finnegan Continues to have mid back and posterior scapular pain.  Has been avoiding taking muscle relaxer.     Pain Rating (0-10): 4    Objective     See Exercise, Manual, and Modality Logs for complete treatment.     Patient Education: Follow up with MD regarding collar restrictions    Assessment & Plan     Assessment  Assessment details: Severely tender to palpate in mid-lower trap and rhomboids, addition of open book ex and threading the needle for mid-spine mobility. Fair tolerance noted with exs. Is able to complete 13 bicep curls before requiring scapular compensations to lift 1# weight. Fatigue reported at end of exs.           Progress per Plan of Care and Progress strengthening /stabilization /functional activity.  Patient is scheduled for his post-op CT scan on Monday.            Timed:         Manual Therapy:    12     mins  08551;     Therapeutic Exercise:    23     mins  37212;     Neuromuscular Tono:        mins  43485;    Therapeutic Activity:          mins  02216;     Gait Training:           mins  86551;     Ultrasound:          mins  29561;    Ionto                                   mins   16270  Self Care                            mins   76499  Canalith Repos                   mins  17838    Un-Timed:  Electrical Stimulation:         mins  57332 ( );  Dry Needling          mins self-pay  Traction          mins 06137  Low Eval          Mins  33398  Mod Eval          Mins  51415  High Eval                            Mins  44623  Re-Eval                               mins  42860    Timed Treatment:   35   mins   Total Treatment:     35   mins    Vandana Alexandra PTA  Physical Therapist Assistant

## 2021-10-11 ENCOUNTER — HOSPITAL ENCOUNTER (OUTPATIENT)
Dept: CT IMAGING | Facility: HOSPITAL | Age: 66
Discharge: HOME | End: 2021-10-11
Admitting: NEUROLOGICAL SURGERY

## 2021-10-11 DIAGNOSIS — Z98.1 STATUS POST CERVICAL SPINAL FUSION: ICD-10-CM

## 2021-10-11 PROCEDURE — 72125 CT NECK SPINE W/O DYE: CPT

## 2021-10-12 ENCOUNTER — TREATMENT (OUTPATIENT)
Dept: PHYSICAL THERAPY | Facility: CLINIC | Age: 66
End: 2021-10-12

## 2021-10-12 DIAGNOSIS — R29.898 UPPER EXTREMITY WEAKNESS: ICD-10-CM

## 2021-10-12 DIAGNOSIS — Z98.1 S/P CERVICAL SPINAL FUSION: Primary | ICD-10-CM

## 2021-10-12 DIAGNOSIS — M54.2 CERVICAL PAIN: ICD-10-CM

## 2021-10-12 PROCEDURE — 97110 THERAPEUTIC EXERCISES: CPT | Performed by: PHYSICAL THERAPIST

## 2021-10-12 PROCEDURE — 97140 MANUAL THERAPY 1/> REGIONS: CPT | Performed by: PHYSICAL THERAPIST

## 2021-10-12 NOTE — PROGRESS NOTES
Physical Therapy Daily Progress Note    VISIT#: 4    Subjective   Justo Finnegan reports that yesterday was a bad day.  He states that he had a lot of pain and had to put his soft collar back on to find relief.  He did have a CT scan yesterday but has not heard the results from his surgeon.   Pain Rating (0-10): 3-4/10    Objective     See Exercise, Manual, and Modality Logs for complete treatment.     Patient Education: Continue current HEP    Assessment & Plan     Assessment  Assessment details: Patient continues with significant daily pain and R UE weakness.  He fatigued quickly with attempt at 2# bicep curl and could only perform 4 repetitions.         Progress per Plan of Care and Progress strengthening /stabilization /functional activity          Timed:         Manual Therapy:    15     mins  71002;     Therapeutic Exercise:    25     mins  65891;     Neuromuscular Tono:        mins  59044;    Therapeutic Activity:          mins  23432;     Gait Training:           mins  85439;     Ultrasound:          mins  07570;    Ionto                                   mins   95249  Self Care                            mins   79530  Canalith Repos                   mins  31710    Un-Timed:  Electrical Stimulation:         mins  20762 ( );  Dry Needling          mins self-pay  Traction          mins 30392  Re-Eval                               mins  69056    Timed Treatment:   40   mins   Total Treatment:     40   mins    Cookie Jasmine PT  Physical Therapist

## 2021-10-14 ENCOUNTER — TREATMENT (OUTPATIENT)
Dept: PHYSICAL THERAPY | Facility: CLINIC | Age: 66
End: 2021-10-14

## 2021-10-14 DIAGNOSIS — M54.2 CERVICAL PAIN: ICD-10-CM

## 2021-10-14 DIAGNOSIS — R29.898 UPPER EXTREMITY WEAKNESS: ICD-10-CM

## 2021-10-14 DIAGNOSIS — Z98.1 S/P CERVICAL SPINAL FUSION: Primary | ICD-10-CM

## 2021-10-14 PROCEDURE — 97110 THERAPEUTIC EXERCISES: CPT | Performed by: PHYSICAL THERAPIST

## 2021-10-14 PROCEDURE — 97140 MANUAL THERAPY 1/> REGIONS: CPT | Performed by: PHYSICAL THERAPIST

## 2021-10-14 NOTE — PROGRESS NOTES
Physical Therapy Daily Progress Note    VISIT#: 5    Subjective   Justo Finnegan reports that he has not been able to get out of his soft collar. His neck is just too weak and it is painful and fatiguing   Pain Rating (0-10): 3-4/10    Objective     See Exercise, Manual, and Modality Logs for complete treatment.     Patient Education: Continue current HEP    Assessment & Plan     Assessment  Assessment details: Pt presents with scapular pain near rhomboids and middle trap CHATO, is very TTP and this restricts pts motion. Reports increased fatigue with therex. Educated to ice at home for muscle relaxation, pain relief and prevention of DOMS.         Progress per Plan of Care and Progress strengthening /stabilization /functional activity     Timed:         Manual Therapy:    15     mins  73916;     Therapeutic Exercise:    25     mins  25506;     Neuromuscular Tono:        mins  92085;    Therapeutic Activity:          mins  56562;     Gait Training:           mins  95500;     Ultrasound:          mins  68182;    Ionto                                   mins   35681  Self Care                            mins   36609  Canalith Repos                   mins  79188    Un-Timed:  Electrical Stimulation:         mins  73368 ( );  Dry Needling          mins self-pay  Traction          mins 23744  Re-Eval                               mins  16473    Timed Treatment:   40   mins   Total Treatment:     40   mins    Vandana Alexandra PTA  Physical Therapist Assistant

## 2021-10-21 ENCOUNTER — TREATMENT (OUTPATIENT)
Dept: PHYSICAL THERAPY | Facility: CLINIC | Age: 66
End: 2021-10-21

## 2021-10-21 DIAGNOSIS — M54.2 CERVICAL PAIN: ICD-10-CM

## 2021-10-21 DIAGNOSIS — Z98.1 S/P CERVICAL SPINAL FUSION: Primary | ICD-10-CM

## 2021-10-21 DIAGNOSIS — R29.898 UPPER EXTREMITY WEAKNESS: ICD-10-CM

## 2021-10-21 PROCEDURE — 97110 THERAPEUTIC EXERCISES: CPT | Performed by: PHYSICAL THERAPIST

## 2021-10-21 PROCEDURE — 97140 MANUAL THERAPY 1/> REGIONS: CPT | Performed by: PHYSICAL THERAPIST

## 2021-10-21 NOTE — PROGRESS NOTES
Physical Therapy Daily Treatment Note    Patient: Justo Finnegan  : 1955  Referring Practitioner: Juan Manuel Azul IV, MD  Date of Initial Visit: Type: THERAPY  Noted: 2021  Today's Date: 10/21/2021  Patient seen for: 6 sessions      Visit Diagnoses:     ICD-10-CM ICD-9-CM   1. S/P cervical spinal fusion  Z98.1 V45.4   2. Cervical pain  M54.2 723.1   3. Upper extremity weakness  R29.898 729.89       Subjective   Justo Finnegan reports: he did well with traveling to [place] and back. Continues to have tenderness and discomfort in mid scapular region L>R.     Pain Level (0-10): 4    Objective     See Exercise, Manual, and Modality Logs for complete treatment.     Patient Education: Gravity eliminated bicep curls, heat for mm relaxation to mid back and scapular region.     Assessment & Plan     Assessment  Assessment details: Pt with fair tolerance to STM/MFR, TTP in L rhomboid mm belly and near LS insertion, does better with less pressure during massage. Is able to perform 6 bicep curls with 2# weight, was only able to perform 4 at last session. Continued skilled PT services recommended for facilitation to PLOF.           Progress strengthening /stabilization /functional activity           Timed:         Manual Therapy:    15     mins  13774;     Therapeutic Exercise:    23     mins  25582;     Neuromuscular Tono:        mins  31971;    Therapeutic Activity:          mins  31906;     Gait Training:           mins  24062;     Ultrasound:          mins  12778;    Ionto                                   mins   44021  Self Care                            mins   47082      Un-Timed:  Electrical Stimulation:         mins  25634 ( );  Traction          mins 93822      Timed Treatment:  38    mins   Total Treatment:     38   mins      Vandana Alexandra PTA  Physical Therapist Assistant  [place] License: [id number]

## 2021-10-27 ENCOUNTER — TELEPHONE (OUTPATIENT)
Dept: PHYSICAL THERAPY | Facility: CLINIC | Age: 66
End: 2021-10-27

## 2021-10-29 ENCOUNTER — TREATMENT (OUTPATIENT)
Dept: PHYSICAL THERAPY | Facility: CLINIC | Age: 66
End: 2021-10-29

## 2021-10-29 DIAGNOSIS — Z98.1 S/P CERVICAL SPINAL FUSION: Primary | ICD-10-CM

## 2021-10-29 DIAGNOSIS — R29.898 UPPER EXTREMITY WEAKNESS: ICD-10-CM

## 2021-10-29 DIAGNOSIS — M54.2 CERVICAL PAIN: ICD-10-CM

## 2021-10-29 PROCEDURE — 97140 MANUAL THERAPY 1/> REGIONS: CPT | Performed by: PHYSICAL THERAPIST

## 2021-10-29 PROCEDURE — 97110 THERAPEUTIC EXERCISES: CPT | Performed by: PHYSICAL THERAPIST

## 2021-10-29 NOTE — PROGRESS NOTES
Physical Therapy Daily Note / Progress Note    Patient: Justo Finnegan  : 1955  Referring practitioner: Juan Manuel Azul IV, MD  Date of Initial Visit: Type: THERAPY  Noted: 2021  Today's Date: 10/29/2021  Patient seen for 7 sessions      Visit Diagnoses:    ICD-10-CM ICD-9-CM   1. S/P cervical spinal fusion  Z98.1 V45.4   2. Cervical pain  M54.2 723.1   3. Upper extremity weakness  R29.898 729.89       VISIT#: 7    Subjective   Justo Finnegan reports that he is no longer wearing his brace and he is trying to work on his motion.  He feels 75% better overall but his R arm is still pretty weak.    Pain Rating (0-10): 3  Neck Disability Index: 13/50 (Previously 23/50)    Objective          Palpation   Left   Hypertonic in the pectoralis minor and upper trapezius.   Tenderness of the upper trapezius.     Right   Hypertonic in the pectoralis minor and upper trapezius. Tenderness of the upper trapezius.     Neurological Testing     Sensation   Cervical/Thoracic     Right   Intact: light touch    Active Range of Motion   Cervical/Thoracic Spine   Cervical    Flexion: 30 degrees   Extension: 40 degrees   Left lateral flexion: 53 degrees   Right lateral flexion: 48 degrees   Left rotation: 32 degrees   Right rotation: 38 degrees     Strength/Myotome Testing     Right Shoulder     Planes of Motion   Flexion: 4+   Abduction: 5   External rotation at 0°: 5   Internal rotation at 0°: 5     Right Elbow   Flexion: 4-  Extension: 5    Left Wrist/Hand      (2nd hand position)     Trial 1: 82 lbs    Trial 2: 72 lbs    Trial 3: 83 lbs    Average: 79 lbs    Right Wrist/Hand   Wrist extension: 5  Wrist flexion: 5     (2nd hand position)     Trial 1: 64 lbs    Trial 2: 60 lbs    Trial 3: 65 lbs    Average: 63 lbs        See Exercise, Manual, and Modality Logs for complete treatment.     Patient Education: Continue current HEP    Assessment & Plan     Assessment  Impairments: abnormal muscle firing, abnormal or restricted  ROM, activity intolerance, impaired physical strength and pain with function  Assessment details: Patient has attended 7 visits in outpatient physical therapy following his ACDF C4-7 on 9/7/21.  He has made great improvements in reduction of pain, reduction of headaches and improvements in function.  He does continue with significant biceps and shoulder flexion weakness impacting his daily function.  He has difficulty lifting more that 2# with his right arm due to weakness post-op.  The patient would benefit from continued skilled PT services to address functional limitations and impairments and to improve patient quality of life.      Barriers to therapy: post-op complications and related bicep weakness  Prognosis: good  Functional Limitations: carrying objects, lifting, pulling, pushing, uncomfortable because of pain, reaching overhead and unable to perform repetitive tasks  Goals  Plan Goals: STG's: 3 weeks   Patient will report a reduction in pain by >25% - MET  Patient will be able to perform HEP with minimal verbal cues -MET    LTG's: By discharge   Patient will report a reduction in pain by >75%- PARTIALLY MET  Patient will be able to write without increased pain - PARTIALLY MET  Patient will have >10% improvement on the Neck Disability Index to demonstrate overall improvement in daily functional level - MET  Patient will be able to reach into overhead cabinet to get a dish without pain or difficulty - NOT MET  Patient will be independent with undergarment dressing without pain - PARTIALLY MET, difficulty reaching back and lifting  Patient will be able to tolerate sitting > 60 minutes without increased pain - MET  Patient will demonstrate sufficient cervical AROM to allow patient to turn his head to view blindspots when driving - PARTIALLY MET  Patient will be able to sleep > 5 hours without waking in pain - MET  Patient will be independent with HEP- PARTIALLY MET      Plan  Therapy options: will be seen for  skilled physical therapy services  Planned modality interventions: cryotherapy, dry needling, electrical stimulation/Russian stimulation, TENS and thermotherapy (hydrocollator packs)  Planned therapy interventions: manual therapy, motor coordination training, neuromuscular re-education, ADL retraining, postural training, fine motor coordination training, flexibility, functional ROM exercises, home exercise program, IADL retraining, soft tissue mobilization, strengthening, stretching and therapeutic activities  Frequency: 2x week  Duration in visits: 13  Duration in weeks: 7  Treatment plan discussed with: patient      Progress per Plan of Care and Progress strengthening /stabilization /functional activity          Timed:         Manual Therapy:    15     mins  85809;     Therapeutic Exercise:    25     mins  51416;     Neuromuscular Tono:        mins  67999;    Therapeutic Activity:          mins  50318;     Gait Training:           mins  47309;     Ultrasound:          mins  99099;    Ionto                                   mins   91006  Self Care                            mins   15281  Canalith Repos                   mins  49686    Un-Timed:  Electrical Stimulation:         mins  00665 ( );  Dry Needling          mins 31691/82021  Traction          mins 84138  Re-Eval                               mins  70109    Timed Treatment:   40   mins   Total Treatment:     40   mins        Cookie Jasmine PT  Physical Therapist  [place] License: [id number]

## 2021-11-04 ENCOUNTER — TREATMENT (OUTPATIENT)
Dept: PHYSICAL THERAPY | Facility: CLINIC | Age: 66
End: 2021-11-04

## 2021-11-04 DIAGNOSIS — M54.2 CERVICAL PAIN: ICD-10-CM

## 2021-11-04 DIAGNOSIS — R29.898 UPPER EXTREMITY WEAKNESS: ICD-10-CM

## 2021-11-04 DIAGNOSIS — Z98.1 S/P CERVICAL SPINAL FUSION: Primary | ICD-10-CM

## 2021-11-04 PROCEDURE — 97140 MANUAL THERAPY 1/> REGIONS: CPT | Performed by: PHYSICAL THERAPIST

## 2021-11-04 PROCEDURE — 97110 THERAPEUTIC EXERCISES: CPT | Performed by: PHYSICAL THERAPIST

## 2021-11-04 NOTE — PROGRESS NOTES
Physical Therapy Daily Treatment Note    Patient: Justo Finnegan  : 1955  Referring Practitioner: Juan Manuel Azul IV, MD  Date of Initial Visit: Type: THERAPY  Noted: 2021  Today's Date: 2021  Patient seen for: 8 sessions      Visit Diagnoses:     ICD-10-CM ICD-9-CM   1. S/P cervical spinal fusion  Z98.1 V45.4   2. Cervical pain  M54.2 723.1   3. Upper extremity weakness  R29.898 729.89       Subjective   Justo Finnegan reports he is doing really well, was able to curl a 1# weight for 20+ reps yesterday. Did want to attempt the 2# weight, but figured he would wait until after PT today.     Pain Level (0-10): 2    Objective     See Exercise, Manual, and Modality Logs for complete treatment.     Patient Education: how to advance with repetitions before advancing weight at home.     Assessment & Plan     Assessment  Assessment details: Shahid presents to therapy with lower pain, improved motion and improving muscular control. Bicep curls this session are performed with less scapular compensations noted and is able to perform 1 set, 7 reps with YTB, but is not able to perform with resistance on second set of 7. Addition of weight during wall slides. Pt reports fatigue following but no increased pain.         Progress strengthening /stabilization /functional activity           Timed:         Manual Therapy:    10     mins  41868;     Therapeutic Exercise:    30     mins  79749;     Neuromuscular Tono:        mins  20639;    Therapeutic Activity:          mins  29935;     Gait Training:           mins  33837;     Ultrasound:          mins  98701;    Ionto                                   mins   01161  Self Care                            mins   19563      Un-Timed:  Electrical Stimulation:         mins  45038 ( );  Traction          mins 30464      Timed Treatment:   40   mins   Total Treatment:     40   mins      Vandana Alexandra PTA  Physical Therapist Assistant  [place] License: [id number]

## 2021-11-10 ENCOUNTER — TREATMENT (OUTPATIENT)
Dept: PHYSICAL THERAPY | Facility: CLINIC | Age: 66
End: 2021-11-10

## 2021-11-10 DIAGNOSIS — R29.898 UPPER EXTREMITY WEAKNESS: ICD-10-CM

## 2021-11-10 DIAGNOSIS — Z98.1 S/P CERVICAL SPINAL FUSION: Primary | ICD-10-CM

## 2021-11-10 DIAGNOSIS — M54.2 CERVICAL PAIN: ICD-10-CM

## 2021-11-10 PROCEDURE — 97110 THERAPEUTIC EXERCISES: CPT | Performed by: PHYSICAL THERAPIST

## 2021-11-10 PROCEDURE — 97140 MANUAL THERAPY 1/> REGIONS: CPT | Performed by: PHYSICAL THERAPIST

## 2021-11-10 NOTE — PROGRESS NOTES
Physical Therapy Daily Treatment Note    Patient: Justo Finnegan  : 1955  Referring Practitioner: Juan Manuel Azul IV, MD  Date of Initial Visit: Type: THERAPY  Noted: 2021  Today's Date: 11/10/2021  Patient seen for: 9 sessions      Visit Diagnoses:     ICD-10-CM ICD-9-CM   1. S/P cervical spinal fusion  Z98.1 V45.4   2. Cervical pain  M54.2 723.1   3. Upper extremity weakness  R29.898 729.89       Subjective   Justo Finnegan reports he is feeling a little stiff today, has not tried ice or heat for muscle relaxation. Continues to be as active as possible at hime.     Pain Level (0-10): 2    Objective     See Exercise, Manual, and Modality Logs for complete treatment.     Patient Education: thermotherapy at home to relieve muscle stiffness.     Assessment & Plan     Assessment  Assessment details: Shahid presents to therapy with tenderness and muscle stiffness in UTs and rhomboids, relieved with MFR utilizing SASTM tool. Following he is able to perform UE exs, again with compensations and fatigue following increased reps. Addition if tricep pull down and stretches.     Goals  Plan Goals: Plan Goals: STG's: 3 weeks   Patient will report a reduction in pain by >25% - MET   Patient will be able to perform HEP with minimal verbal cues - MET    LTG's: By discharge   Patient will report a reduction in pain by >75%  Patient will be able to write without increased pain  Patient will have >10% improvement on the Neck Disability Index to demonstrate overall improvement in daily functional level  Patient will be able to reach into overhead cabinet to get a dish without pain or difficulty  Patient will be independent with undergarment dressing without pain   Patient will be able to tolerate sitting > 60 minutes without increased pain  Patient will demonstrate sufficient cervical AROM to allow patient to turn his head to view blindspots when driving  Patient will be able to sleep > 5 hours without waking in pain   Patient  will be independent with HEP        Progress strengthening /stabilization /functional activity           Timed:         Manual Therapy:    10     mins  50495;     Therapeutic Exercise:    30     mins  88478;     Neuromuscular Tono:        mins  78477;    Therapeutic Activity:          mins  48545;     Gait Training:           mins  97808;     Ultrasound:          mins  02302;    Ionto                                   mins   84279  Self Care                            mins   07293      Un-Timed:  Electrical Stimulation:         mins  09869 ( );  Traction          mins 06561      Timed Treatment:   40   mins   Total Treatment:     40   mins      Vandana Alexandra PTA  Physical Therapist Assistant  [place] License: [id number]

## 2021-11-12 ENCOUNTER — TREATMENT (OUTPATIENT)
Dept: PHYSICAL THERAPY | Facility: CLINIC | Age: 66
End: 2021-11-12

## 2021-11-12 DIAGNOSIS — R29.898 UPPER EXTREMITY WEAKNESS: ICD-10-CM

## 2021-11-12 DIAGNOSIS — M54.2 CERVICAL PAIN: ICD-10-CM

## 2021-11-12 DIAGNOSIS — Z98.1 S/P CERVICAL SPINAL FUSION: Primary | ICD-10-CM

## 2021-11-12 PROCEDURE — 97110 THERAPEUTIC EXERCISES: CPT | Performed by: PHYSICAL THERAPIST

## 2021-11-12 PROCEDURE — 97140 MANUAL THERAPY 1/> REGIONS: CPT | Performed by: PHYSICAL THERAPIST

## 2021-11-12 NOTE — PROGRESS NOTES
Physical Therapy Daily Progress Note    Patient: Justo Finnegan  : 1955  Referring practitioner: Juan Manuel Azul IV, MD  Date of Initial Visit: Type: THERAPY  Noted: 2021  Today's Date: 2021  Patient seen for 10 sessions      Visit Diagnoses:    ICD-10-CM ICD-9-CM   1. S/P cervical spinal fusion  Z98.1 V45.4   2. Cervical pain  M54.2 723.1   3. Upper extremity weakness  R29.898 729.89       VISIT#: 10    Subjective   Justo Finnegan reports that he has no pain today, just stiffness in his neck and a headache 5/10.  He states that he still hasn't heard from his surgeon on the results of his post-op CT.  He is frustrated that he can lift with his right hand.  His biceps isn't working.  Pain Rating (0-10): 5    Objective     See Exercise, Manual, and Modality Logs for complete treatment.     Patient Education: Continue current HEP    Assessment & Plan     Assessment  Assessment details: Patient had significant difficulty with bicep curls when attempting with hand weights and without weight.  With manual muscle testing, his R biceps is 3-/5 today.   Recommend follow-up with surgeon to discuss continued weakness and his CT results.        Progress per Plan of Care and Progress strengthening /stabilization /functional activity          Timed:         Manual Therapy:    10     mins  21223;     Therapeutic Exercise:    30     mins  30954;     Neuromuscular Tono:        mins  04546;    Therapeutic Activity:          mins  57596;     Gait Training:           mins  33667;     Ultrasound:          mins  73953;    Ionto                                   mins   44165  Self Care                            mins   74398  Canalith Repos                   mins  90638    Un-Timed:  Electrical Stimulation:         mins  48190 ( );  Dry Needling          mins 30784/  Traction          mins 69374  Re-Eval                               mins  13355    Timed Treatment:   40   mins   Total Treatment:     40    ean Jasmine, PT  Physical Therapist  [place] License: [id number]

## 2021-11-16 ENCOUNTER — TREATMENT (OUTPATIENT)
Dept: PHYSICAL THERAPY | Facility: CLINIC | Age: 66
End: 2021-11-16

## 2021-11-16 DIAGNOSIS — M54.2 CERVICAL PAIN: ICD-10-CM

## 2021-11-16 DIAGNOSIS — Z98.1 S/P CERVICAL SPINAL FUSION: Primary | ICD-10-CM

## 2021-11-16 DIAGNOSIS — R29.898 UPPER EXTREMITY WEAKNESS: ICD-10-CM

## 2021-11-16 PROCEDURE — 97110 THERAPEUTIC EXERCISES: CPT | Performed by: PHYSICAL THERAPIST

## 2021-11-16 PROCEDURE — 97140 MANUAL THERAPY 1/> REGIONS: CPT | Performed by: PHYSICAL THERAPIST

## 2021-11-16 NOTE — PROGRESS NOTES
Subjective   History of Present Illness: Justo Finnegan is a 66 y.o. male is here today for follow-up after physical therapy and a CT. overall the patient is doing well and seeing slow improvement in his preoperative deficits especially strength.  He continues to do physical therapy      Previous Treatment:    The following portions of the patient's history were reviewed and updated as appropriate: allergies, current medications, past family history, past medical history, past social history, past surgical history and problem list.    Review of Systems   Constitutional: Positive for activity change.   Respiratory: Negative for chest tightness and shortness of breath.    Cardiovascular: Negative for chest pain.   Musculoskeletal: Positive for neck stiffness.   Neurological: Positive for weakness.       Objective     There were no vitals taken for this visit.   There is no height or weight on file to calculate BMI.      Neurologic Exam    Assessment/Plan   Independent Review of Radiographic Studies:      I personally reviewed and interpreted the images from the following studies.  CT cervical spine: Hardware in place and in good position    Medical Decision Making:      Justo Finnegan is a 66 y.o. male status post 3 level ACDF for myelopathy overall doing well and seeing slow improvement in his preoperative deficits.  I reassured him that it can take a very long period of time to see the full extent of improvement.  CT scan was performed only about a month after surgery and is not a good indicator of fusion but hardware intact and in position.  I will see the patient back in 3 months to evaluate his progress.  Could consider further imaging such as x-ray or another CT scan if he continues to have neck pain to evaluate for fusion      There are no diagnoses linked to this encounter.  No follow-ups on file.    This patient was examined wearing appropriate personal protective equipment.                        Juan Manuel Azul IV    11/16/21  15:21 EST

## 2021-11-16 NOTE — PROGRESS NOTES
Physical Therapy Daily Progress Note    Patient: Justo Finnegan  : 1955  Referring practitioner: Juan Manuel Azul IV, MD  Date of Initial Visit: Type: THERAPY  Noted: 2021  Today's Date: 2021  Patient seen for 11 sessions      Visit Diagnoses:    ICD-10-CM ICD-9-CM   1. S/P cervical spinal fusion  Z98.1 V45.4   2. Cervical pain  M54.2 723.1   3. Upper extremity weakness  R29.898 729.89       VISIT#: 11    Subjective   Justo Finnegan reports that he is following up with his surgeon tomorrow.  He will be leaving for [place] next week for a  vacation so he will not be able to attend PT next week.  Pain Rating (0-10): 4    Objective     See Exercise, Manual, and Modality Logs for complete treatment.     Patient Education: Continue current HEP and follow-up with MD    Assessment & Plan     Assessment  Assessment details: Patient continues with moderate biceps weakness.  Recommend follow-up with surgeon for further evaluation.         Progress per Plan of Care and Progress strengthening /stabilization /functional activity          Timed:         Manual Therapy:    10     mins  41844;     Therapeutic Exercise:    30     mins  88604;     Neuromuscular Tono:        mins  51269;    Therapeutic Activity:          mins  80864;     Gait Training:           mins  46365;     Ultrasound:          mins  17346;    Ionto                                   mins   62695  Self Care                            mins   04609  Canalith Repos                   mins  41241    Un-Timed:  Electrical Stimulation:         mins  68521 ( );  Dry Needling          mins 66319/07897  Traction          mins 06686  Re-Eval                               mins  85194    Timed Treatment:   40   mins   Total Treatment:     40   mins        Cookie Jasmine PT  Physical Therapist  [place] License: [id number]

## 2021-11-17 ENCOUNTER — OFFICE VISIT (OUTPATIENT)
Dept: NEUROSURGERY | Facility: CLINIC | Age: 66
End: 2021-11-17

## 2021-11-17 VITALS
BODY MASS INDEX: 29.47 KG/M2 | HEIGHT: 66 IN | HEART RATE: 74 BPM | OXYGEN SATURATION: 95 % | DIASTOLIC BLOOD PRESSURE: 99 MMHG | WEIGHT: 183.4 LBS | SYSTOLIC BLOOD PRESSURE: 153 MMHG | TEMPERATURE: 97.1 F

## 2021-11-17 DIAGNOSIS — Z98.1 STATUS POST CERVICAL SPINAL FUSION: Primary | ICD-10-CM

## 2021-11-17 PROCEDURE — 99024 POSTOP FOLLOW-UP VISIT: CPT | Performed by: NEUROLOGICAL SURGERY

## 2021-11-18 ENCOUNTER — TREATMENT (OUTPATIENT)
Dept: PHYSICAL THERAPY | Facility: CLINIC | Age: 66
End: 2021-11-18

## 2021-11-18 DIAGNOSIS — M54.2 CERVICAL PAIN: ICD-10-CM

## 2021-11-18 DIAGNOSIS — Z98.1 S/P CERVICAL SPINAL FUSION: Primary | ICD-10-CM

## 2021-11-18 PROCEDURE — 97110 THERAPEUTIC EXERCISES: CPT | Performed by: PHYSICAL THERAPIST

## 2021-11-18 PROCEDURE — 97140 MANUAL THERAPY 1/> REGIONS: CPT | Performed by: PHYSICAL THERAPIST

## 2021-11-18 NOTE — PROGRESS NOTES
Physical Therapy Daily Treatment Note    Patient: Justo Finnegan  : 1955  Referring Practitioner: Juan Manuel Azul IV, MD  Date of Initial Visit: Type: THERAPY  Noted: 2021  Today's Date: 2021  Patient seen for: 12 sessions      Visit Diagnoses:     ICD-10-CM ICD-9-CM   1. S/P cervical spinal fusion  Z98.1 V45.4   2. Cervical pain  M54.2 723.1       Subjective   Justo Finnegan reports that he is still stiff and wakes with daily headaches. Isn't sure if it is directly tied to his surgery, but does feel it is due to possible lack of sleep and tension in his cervical and scapular muscles. He did see Dr. Azul yesterday, the appointment went well and Dr. Azul is not concerned at this time. He was re-assured that recovery will take time.     Pain Level (0-10): 4    Objective     See Exercise, Manual, and Modality Logs for complete treatment.     Patient Education: YTB provided so pt can perform HEP when out of town. Educated on importance of QUALITY over quantity in regards to repetitions and weight.     Assessment & Plan     Assessment  Assessment details: Shahid continues to have muscle tension and soreness of cervical and scapular musculature. Weakness persists, and fatigue is quick to set in. Pt to perform HEP when out of town for the holiday.           Progress strengthening /stabilization /functional activity           Timed:         Manual Therapy:    10     mins  05299;     Therapeutic Exercise:    18     mins  32368;     Neuromuscular Tono:        mins  63880;    Therapeutic Activity:     5     mins  26414;     Gait Training:           mins  30439;     Ultrasound:          mins  93276;    Ionto                                   mins   81912  Self Care                            mins   23920      Un-Timed:  Electrical Stimulation:         mins  84651 ( );  Traction          mins 44735      Timed Treatment:  30    mins   Total Treatment:     33   mins      Vandana Alexandra PTA  Physical  Therapist Assistant  [place] License: [id number]

## 2021-12-06 ENCOUNTER — TREATMENT (OUTPATIENT)
Dept: PHYSICAL THERAPY | Facility: CLINIC | Age: 66
End: 2021-12-06

## 2021-12-06 DIAGNOSIS — Z98.1 S/P CERVICAL SPINAL FUSION: Primary | ICD-10-CM

## 2021-12-06 DIAGNOSIS — M54.2 CERVICAL PAIN: ICD-10-CM

## 2021-12-06 DIAGNOSIS — R29.898 UPPER EXTREMITY WEAKNESS: ICD-10-CM

## 2021-12-06 PROCEDURE — 97110 THERAPEUTIC EXERCISES: CPT | Performed by: PHYSICAL THERAPIST

## 2021-12-06 NOTE — PROGRESS NOTES
"Physical Therapy Daily Treatment Note    Patient: Justo Finnegan  : 1955  Referring Practitioner: Juan Manuel Azul IV, MD  Date of Initial Visit: Type: THERAPY  Noted: 2021  Today's Date: 2021  Patient seen for: 13 sessions      Visit Diagnoses:     ICD-10-CM ICD-9-CM   1. S/P cervical spinal fusion  Z98.1 V45.4   2. Cervical pain  M54.2 723.1   3. Upper extremity weakness  R29.898 729.89       Subjective   Justo Finnegan voices frustration with lack of progress in regards to strength. Reports that he has been working on his HEP to reduce pain and increase strength with no improvement noted. Continues to be stiff and lacks endurance. States he can  a cup in his R hand, but has to support his forearm with his L hand in order to hold the weight of the cup for longer than a minute or two. States that this has really impacted his ability to assist his spouse around the house and that \"it's depressing\".     Pain Level (0-10): denies any overt pain when at rest, but does report significant discomfort when bending over to pick items up off of the floor.     Objective     Quick Dash- 39/11 = 63.63% impairment  NDI - 22/50 = 44% impairment (Regression from 10/29 progress note)    See Exercise, Manual, and Modality Logs for complete treatment.     Patient Education: Continue with HEP     Assessment & Plan     Assessment    Assessment details: Began session with warm-up on UBE, which pt reports N/T in L arm down to all fingers which is relieved when activity is stopped. Continued session with therex focus for strengthening and ROM. All ex's performed in sitting or standing with no reports of increased pain or difficulty. Shahid is able to perform bicep curl x 11 with 1# DB without any significant scapular compensations. Following rest, he is able to perform a second set with a 2# DB and minimal compensations. He does, however, fatigue quickly and requires TC's to prevent UT compensations with active shoulder " flexion. Inconsistencies are noted from NDI filled out on 10/29/2021 and NDI filled out today- will need this re-assessed at next session.     Goals  Plan Goals: Plan Goals: STG's: 3 weeks   Patient will report a reduction in pain by >25% - MET  Patient will be able to perform HEP with minimal verbal cues -MET    LTG's: By discharge   Patient will report a reduction in pain by >75%- PARTIALLY MET  Patient will be able to write without increased pain - PARTIALLY MET  Patient will have >10% improvement on the Neck Disability Index to demonstrate overall improvement in daily functional level - MET  Patient will be able to reach into overhead cabinet to get a dish without pain or difficulty - NOT MET  Patient will be independent with undergarment dressing without pain - PARTIALLY MET, difficulty reaching back and lifting  Patient will be able to tolerate sitting > 60 minutes without increased pain - MET  Patient will demonstrate sufficient cervical AROM to allow patient to turn his head to view blindspots when driving - PARTIALLY MET  Patient will be able to sleep > 5 hours without waking in pain - MET  Patient will be independent with HEP- PARTIALLY MET      Progress strengthening /stabilization /functional activity           Timed:         Manual Therapy:    8     mins  06697;     Therapeutic Exercise:    23     mins  09362;     Neuromuscular Tono:        mins  09413;    Therapeutic Activity:     6     mins  31259;     Gait Training:           mins  99544;     Ultrasound:          mins  40969;    Ionto                                   mins   58158  Self Care                            mins   98056      Un-Timed:  Electrical Stimulation:         mins  36654 ( );  Traction          mins 77350      Timed Treatment:  37    mins   Total Treatment:     37   mins      Vandana Alexandra PTA  Physical Therapist Assistant  [place] License: [id number]

## 2021-12-17 ENCOUNTER — TREATMENT (OUTPATIENT)
Dept: PHYSICAL THERAPY | Facility: CLINIC | Age: 66
End: 2021-12-17

## 2021-12-17 DIAGNOSIS — R29.898 UPPER EXTREMITY WEAKNESS: ICD-10-CM

## 2021-12-17 DIAGNOSIS — Z98.1 S/P CERVICAL SPINAL FUSION: Primary | ICD-10-CM

## 2021-12-17 DIAGNOSIS — M54.2 CERVICAL PAIN: ICD-10-CM

## 2021-12-17 PROCEDURE — 97110 THERAPEUTIC EXERCISES: CPT | Performed by: PHYSICAL THERAPIST

## 2021-12-17 NOTE — PROGRESS NOTES
Physical Therapy Daily Treatment Note    Patient: Justo Finnegan  : 1955  Referring Practitioner: Juan Manuel Azul IV, MD  Date of Initial Visit: Type: THERAPY  Noted: 2021  Today's Date: 2021  Patient seen for: 14 sessions      Visit Diagnoses:     ICD-10-CM ICD-9-CM   1. S/P cervical spinal fusion  Z98.1 V45.4   2. Cervical pain  M54.2 723.1   3. Upper extremity weakness  R29.898 729.89       Subjective   Justo Finnegan reports: he has been trying to make all of his ADL tasks his therapy, looking for opportunities to improve his strength in a functional way. Does report things are getting easier, but continues to have difficulty with repetitions of bicep curls- even with bringing food to his mouth.    Pain Level (0-10): 3 in neck with rotations and when bending forward to pick something up off the ground.     Objective     See Exercise, Manual, and Modality Logs for complete treatment.     Patient Education: continue current    Assessment & Plan     Assessment    Assessment details: Shahid continues to demonstrate muscle weakness and fatigue with repetitions of ex's. Max reps are around 8 with all ex's, when performed with or without weights/resistance. Addition of 2# weight with wall slides in flexion and scaption, held for horiz reaching. Progressed to 3# weight with bicep curl.       Other - Due for 30 day prog note and needs insurance auth following next visit.            Timed:         Manual Therapy:    3     mins  64249;     Therapeutic Exercise:    35     mins  06861;     Neuromuscular Tono:        mins  76589;    Therapeutic Activity:          mins  03483;     Gait Training:           mins  87677;     Ultrasound:          mins  90635;    Ionto                                   mins   52840  Self Care                            mins   01537      Un-Timed:  Electrical Stimulation:         mins  40240 ( );  Traction          mins 34082      Timed Treatment:   38   mins   Total Treatment:      38   mins      Vandana Alexandra PTA  Physical Therapist Assistant  [place] License: [id number]

## 2021-12-20 ENCOUNTER — TREATMENT (OUTPATIENT)
Dept: PHYSICAL THERAPY | Facility: CLINIC | Age: 66
End: 2021-12-20

## 2021-12-20 DIAGNOSIS — Z98.1 S/P CERVICAL SPINAL FUSION: Primary | ICD-10-CM

## 2021-12-20 DIAGNOSIS — M54.2 CERVICAL PAIN: ICD-10-CM

## 2021-12-20 DIAGNOSIS — R29.898 UPPER EXTREMITY WEAKNESS: ICD-10-CM

## 2021-12-20 PROCEDURE — 97110 THERAPEUTIC EXERCISES: CPT | Performed by: PHYSICAL THERAPIST

## 2021-12-20 PROCEDURE — 97140 MANUAL THERAPY 1/> REGIONS: CPT | Performed by: PHYSICAL THERAPIST

## 2021-12-20 NOTE — PROGRESS NOTES
Re-Assessment / Re-Certification        Patient: Justo Finnegan   : 1955  Diagnosis/ICD-10 Code:  S/P cervical spinal fusion [Z98.1]  Referring practitioner: Juan Manuel Azul IV, MD  Date of Initial Visit: Type: THERAPY  Noted: 2021  Today's Date: 2021  Patient seen for 15 sessions      Visit Diagnoses:      ICD-10-CM ICD-9-CM   1. S/P cervical spinal fusion  Z98.1 V45.4   2. Cervical pain  M54.2 723.1   3. Upper extremity weakness  R29.898 729.89       Subjective:     Justo Finnegan reports that he feels stiff but he is doing better overall.  He still has trouble lifting with his R arm due to continued weakness and he reports tingling in his left arm since surgery.  He states that this keeps him up at night.   He is not scheduled to return to his surgeon in February.   He states that he rarely has headaches now, just stiffness in his neck. Currently, he rates his pain 2/10.  Subjective Questionnaire: NDI:13/50 points  Clinical Progress: improved  Home Program Compliance: Yes  Treatment has included: therapeutic exercise, neuromuscular re-education, manual therapy and therapeutic activity    Subjective   Objective          Palpation   Left   Hypertonic in the pectoralis minor and upper trapezius.   Tenderness of the upper trapezius.     Right   Hypertonic in the pectoralis minor and upper trapezius. Tenderness of the upper trapezius.     Active Range of Motion   Cervical/Thoracic Spine   Cervical    Flexion: 35 degrees   Extension: 48 degrees   Left lateral flexion: 36 degrees   Right lateral flexion: 32 degrees   Left rotation: 74 degrees   Right rotation: 60 degrees     Strength/Myotome Testing     Left Shoulder     Planes of Motion   Flexion: 4+   Abduction: 4+     Right Shoulder     Planes of Motion   Flexion: 4+   Abduction: 4+     Left Elbow   Flexion: 5  Extension: 5    Right Elbow   Flexion: 4-  Extension: 5    Left Wrist/Hand   Wrist extension: 4+  Wrist flexion: 4+     (2nd hand position)      Trial 1: 84.4 lbs    Trial 2: 88 lbs    Trial 3: 80 lbs    Average: 84.13 lbs    Right Wrist/Hand   Wrist extension: 4+  Wrist flexion: 4+     (2nd hand position)     Trial 1: 67 lbs    Trial 2: 68.2 lbs    Trial 3: 62.4 lbs    Average: 65.87 lbs      Assessment & Plan     Assessment  Impairments: abnormal muscle firing, abnormal or restricted ROM, activity intolerance, impaired physical strength and pain with function  Functional Limitations: carrying objects, lifting, pulling, pushing, uncomfortable because of pain, reaching overhead and unable to perform repetitive tasks  Assessment details: Patient has attended 15 visits in outpatient physical therapy following his ACDF C4-7 on 9/7/21.  He has made great improvements in reduction of pain, reduction of headaches and improvements in function.  He does continue with significant biceps weakness impacting his daily function.  He has difficulty lifting more that 3# with his right arm due to weakness post-op.  The patient would benefit from continued skilled PT services to address functional limitations and impairments and to improve patient quality of life.        Barriers to therapy: post-op complications and related bicep weakness  Prognosis: good    Goals  Plan Goals: STG's: 3 weeks   Patient will report a reduction in pain by >25% - MET  Patient will be able to perform HEP with minimal verbal cues -MET    LTG's: By discharge   Patient will report a reduction in pain by >75%- PARTIALLY MET  Patient will be able to write without increased pain - PARTIALLY MET  Patient will have >10% improvement on the Neck Disability Index to demonstrate overall improvement in daily functional level - MET  Patient will be able to reach into overhead cabinet to get a dish without pain or difficulty - PARTIALLY MET  Patient will be independent with undergarment dressing without pain - PARTIALLY MET, difficulty reaching back and lifting  Patient will be able to tolerate sitting  > 60 minutes without increased pain - MET  Patient will demonstrate sufficient cervical AROM to allow patient to turn his head to view blindspots when driving - PARTIALLY MET  Patient will be able to sleep > 5 hours without waking in pain - MET  Patient will be independent with HEP- PARTIALLY MET      Plan  Therapy options: will be seen for skilled therapy services  Planned modality interventions: cryotherapy, dry needling, electrical stimulation/Russian stimulation, TENS and thermotherapy (hydrocollator packs)  Planned therapy interventions: manual therapy, motor coordination training, neuromuscular re-education, ADL retraining, postural training, fine motor coordination training, flexibility, functional ROM exercises, home exercise program, IADL retraining, soft tissue mobilization, strengthening, stretching and therapeutic activities  Frequency: 2x week  Duration in visits: 10  Duration in weeks: 5  Treatment plan discussed with: patient      Progress toward previous goals: Partially Met       Recommendations: Continue as planned  Timeframe: 3 months  Prognosis to achieve goals: good      Timed:         Manual Therapy:    10     mins  48419;     Therapeutic Exercise:    30     mins  03292;     Neuromuscular Tono:        mins  63310;    Therapeutic Activity:          mins  43713;     Gait Training:           mins  10326;     Ultrasound:          mins  40611;    Ionto                                   mins   66811  Self Care                            mins   37845  Canalith Repos                  mins   52426    Un-Timed:  Electrical Stimulation:         mins  22166 ( );  Dry Needling          mins 71063/75906  Traction          mins 61338  Re-Eval                               mins  56916      Timed Treatment:   40   mins   Total Treatment:     40   mins        PT Signature: Cookie Jasmine PT  [place] License: [id number]      Certification Period: 12/20/2021 thru 3/19/2022  I certify that the therapy services are  furnished while this patient is under my care.  The services outlined above are required by this patient, and will be reviewed every 90 days.    Based upon review of the patient's progress and continued therapy plan, it is my medical opinion that Justo Finnegan should continue physical therapy treatment at [facility]  [address] [place] [address] [place] [address] [phone].      Signature: ____________________________  Physician:  Juan Manuel Azul IV, MD    Date: ________________________________

## 2021-12-21 ENCOUNTER — TELEPHONE (OUTPATIENT)
Dept: NEUROSURGERY | Facility: CLINIC | Age: 66
End: 2021-12-21

## 2021-12-21 NOTE — TELEPHONE ENCOUNTER
Provider: NADEGE  Caller: SATISH  Relationship to Patient: [facility]  Pharmacy:   Phone Number:   Reason for Call: NEED TO HAVE NEW ORDER SIGNED AND FAXED BACK TO GET ADDITIONAL VISITS APPROVED  When was the patient last seen: 11/17/2021    GAVE SATISH FAX # [phone] FOR FORM TO BE SIGNED AND FAXED BACK.    PT NEXT PHYSICAL THERAPY APPT IS TOMORROW, 12/22/2021    PLEASE REVIEW AND ADVISE

## 2021-12-22 ENCOUNTER — DOCUMENTATION (OUTPATIENT)
Dept: PHYSICAL THERAPY | Facility: CLINIC | Age: 66
End: 2021-12-22

## 2021-12-22 ENCOUNTER — TELEPHONE (OUTPATIENT)
Dept: PHYSICAL THERAPY | Facility: CLINIC | Age: 66
End: 2021-12-22

## 2021-12-22 NOTE — TELEPHONE ENCOUNTER
CALLED PT TO NOTIFY :[facility]- NO ADD[facility]L OUT PT THERAPY HAS BEEN AUTH'D. PT  UNDERSTOOD. OK TO DC.

## 2021-12-22 NOTE — TELEPHONE ENCOUNTER
[facility] IS CALLING BACK, AS THEY RECEIVED RFS TO EXTEND PT AUTHORIZATIONS, BUT THEY WILL NOT BE ABLE TO COMPLETE THIS PROCESS BY TODAY'S APPT. WARM TRANSFERRED TO PHYSICAL THERAPY.

## 2021-12-22 NOTE — PROGRESS NOTES
Discharge Summary  Discharge Summary from Physical Therapy Report      Dates  PT visit: 9/30/21-12/20/21  Number of Visits: 15     Discharge Status of Patient: See MD Note dated 12/20/21    Goals: Partially Met    Discharge Plan: Continue with current home exercise program as instructed  Patient to return to referring/providing physician    Comments Patient's insurance declined further authorization and [facility].    Date of Discharge 12/22/21        Cookie Jasmine, PT  Physical Therapist

## 2022-02-15 NOTE — PROGRESS NOTES
"Subjective   History of Present Illness: Justo Finnegan is a 66 y.o. male is here today for a 3 month follow-up. Patient is status post 3 level ACDF for myelopathy. He reports he has right side muscle pain. He has finished PT. overall he seen significant improvement in his upper extremity weakness.  Neck pain improved.      Previous Treatment: C4-7 ACDF 9/7/2021    The following portions of the patient's history were reviewed and updated as appropriate: allergies, current medications, past family history, past medical history, past social history, past surgical history and problem list.    Review of Systems   Respiratory: Negative for chest tightness and shortness of breath.    Cardiovascular: Negative for chest pain.   Musculoskeletal: Positive for myalgias, neck pain and neck stiffness.       Objective     /89   Pulse 68   Temp 98 °F (36.7 °C)   Ht 167.6 cm (65.98\")   Wt 83 kg (183 lb)   BMI 29.55 kg/m²    Body mass index is 29.55 kg/m².      Neurologic Exam    Assessment/Plan   Independent Review of Radiographic Studies:      I personally reviewed and interpreted the images from the following studies.        Medical Decision Making:      Justo Finnegan is a 66 y.o. male status post ACDF for spinal cord nerve compression overall seeing improvement in his significant radicular and myelopathic symptoms.  Patient can slowly ramp up activity normal.  I will see him back in 6 months to evaluate his progress.      There are no diagnoses linked to this encounter.  No follow-ups on file.    This patient was examined wearing appropriate personal protective equipment.                      Dr. Juan Manuel Azul IV    02/16/22  13:24 EST  "

## 2022-02-16 ENCOUNTER — OFFICE VISIT (OUTPATIENT)
Dept: NEUROSURGERY | Facility: CLINIC | Age: 67
End: 2022-02-16

## 2022-02-16 VITALS
DIASTOLIC BLOOD PRESSURE: 89 MMHG | HEIGHT: 66 IN | SYSTOLIC BLOOD PRESSURE: 118 MMHG | BODY MASS INDEX: 29.41 KG/M2 | TEMPERATURE: 98 F | HEART RATE: 68 BPM | WEIGHT: 183 LBS

## 2022-02-16 DIAGNOSIS — Z98.1 STATUS POST CERVICAL SPINAL FUSION: Primary | ICD-10-CM

## 2022-02-16 PROCEDURE — 99212 OFFICE O/P EST SF 10 MIN: CPT | Performed by: NEUROLOGICAL SURGERY

## 2022-08-12 ENCOUNTER — TELEPHONE (OUTPATIENT)
Dept: NEUROSURGERY | Facility: CLINIC | Age: 67
End: 2022-08-12

## 2022-08-12 NOTE — TELEPHONE ENCOUNTER
Called and LVM to call the office. We need to move his fup appointment out to the following week as Dr. Azul is out of the office next week.

## 2022-08-15 NOTE — TELEPHONE ENCOUNTER
I called and spoke with patient regarding changing 8-15-22 appointment to 8-31-22 @ 245 pm due to Dr Azul will be out of the office.

## 2022-08-30 NOTE — PROGRESS NOTES
"Subjective   History of Present Illness: Justo Finnegan is a 66 y.o. male is here today for follow-up. Patient reports some remaining tightness but overall improving.  He has seen significant improvement in strength in his upper extremities since surgery.    Chief Complaint   Patient presents with   • Follow-up     Neck Sx          Previous Treatment:    The following portions of the patient's history were reviewed and updated as appropriate: allergies, current medications, past family history, past medical history, past social history, past surgical history and problem list.    Review of Systems   Constitutional: Positive for activity change.   HENT: Negative.    Eyes: Negative.    Respiratory: Negative.    Cardiovascular: Negative.    Gastrointestinal: Negative.    Endocrine: Negative.    Genitourinary: Negative.    Musculoskeletal: Positive for neck stiffness.   Skin: Negative.    Neurological: Positive for weakness (some in Right Arm).   Hematological: Negative.    Psychiatric/Behavioral: Negative for sleep disturbance.       Objective     /88   Pulse 68   Ht 167.6 cm (65.98\")   Wt 79.3 kg (174 lb 12.8 oz)   SpO2 97%   BMI 28.23 kg/m²    Body mass index is 28.23 kg/m².      Neurologic Exam    Assessment & Plan   Independent Review of Radiographic Studies:      I personally reviewed and interpreted the images from the following studies.    No new imaging    Medical Decision Making:      Justo Finnegan is a 66 y.o. male status post ACDF for severe myelopathy overall doing well in terms of preoperative symptoms.  Patient has no limitations from my perspective.  I will see him back as needed.      Diagnoses and all orders for this visit:    1. Status post cervical spinal fusion (Primary)      No follow-ups on file.    This patient was examined wearing appropriate personal protective equipment.                      Dr. Juan Manuel Azul IV    08/31/22  15:20 EDT  "

## 2022-08-31 ENCOUNTER — OFFICE VISIT (OUTPATIENT)
Dept: NEUROSURGERY | Facility: CLINIC | Age: 67
End: 2022-08-31

## 2022-08-31 VITALS
HEIGHT: 66 IN | HEART RATE: 68 BPM | DIASTOLIC BLOOD PRESSURE: 88 MMHG | SYSTOLIC BLOOD PRESSURE: 146 MMHG | OXYGEN SATURATION: 97 % | BODY MASS INDEX: 28.09 KG/M2 | WEIGHT: 174.8 LBS

## 2022-08-31 DIAGNOSIS — Z98.1 STATUS POST CERVICAL SPINAL FUSION: Primary | ICD-10-CM

## 2022-08-31 PROCEDURE — 99212 OFFICE O/P EST SF 10 MIN: CPT | Performed by: NEUROLOGICAL SURGERY

## 2024-09-18 ENCOUNTER — OFFICE VISIT (OUTPATIENT)
Dept: NEUROSURGERY | Facility: CLINIC | Age: 69
End: 2024-09-18
Payer: OTHER GOVERNMENT

## 2024-09-18 VITALS
BODY MASS INDEX: 26.84 KG/M2 | WEIGHT: 167 LBS | HEIGHT: 66 IN | RESPIRATION RATE: 20 BRPM | HEART RATE: 61 BPM | OXYGEN SATURATION: 97 %

## 2024-09-18 DIAGNOSIS — M48.062 LUMBAR STENOSIS WITH NEUROGENIC CLAUDICATION: Primary | ICD-10-CM

## 2024-09-30 ENCOUNTER — TREATMENT (OUTPATIENT)
Dept: PHYSICAL THERAPY | Facility: CLINIC | Age: 69
End: 2024-09-30
Payer: OTHER GOVERNMENT

## 2024-09-30 DIAGNOSIS — M53.3 SI (SACROILIAC) JOINT DYSFUNCTION: ICD-10-CM

## 2024-09-30 DIAGNOSIS — R29.898 LEG WEAKNESS, BILATERAL: ICD-10-CM

## 2024-09-30 DIAGNOSIS — R26.2 DIFFICULTY WALKING: ICD-10-CM

## 2024-09-30 DIAGNOSIS — R26.9 GAIT ABNORMALITY: ICD-10-CM

## 2024-09-30 DIAGNOSIS — M48.062 LUMBAR STENOSIS WITH NEUROGENIC CLAUDICATION: Primary | ICD-10-CM

## 2024-09-30 PROCEDURE — 97163 PT EVAL HIGH COMPLEX 45 MIN: CPT | Performed by: PHYSICAL THERAPIST

## 2024-09-30 PROCEDURE — 97110 THERAPEUTIC EXERCISES: CPT | Performed by: PHYSICAL THERAPIST

## 2024-09-30 NOTE — PROGRESS NOTES
Physical Therapy Initial Evaluation and Plan of Care  [address] [place] [address]    Patient: Justo Finnegan   : 1955  Diagnosis/ICD-10 Code:  Lumbar stenosis with neurogenic claudication [M48.062]  Referring practitioner: Juan Manuel Azul IV, MD  Date of Initial Visit: 2024  Today's Date: 2024  Patient seen for 1 sessions           Visit Diagnoses:     ICD-10-CM ICD-9-CM   1. Lumbar stenosis with neurogenic claudication  M48.062 724.03   2. SI (sacroiliac) joint dysfunction  M53.3 724.6   3. Leg weakness, bilateral  R29.898 729.89   4. Difficulty walking  R26.2 719.7   5. Gait abnormality  R26.9 781.2        Subjective Questionnaire: Modified Oswestry: 17 = 34% limited    Subjective Evaluation    History of Present Illness  Mechanism of injury: Pt reports hx chronic LBP (~15 yrs) with sciatica around . Pt c/o R LBP which radiates down the leg with neurogenic claudication and radiculopathy and feels like he has to drag the leg.  [facility] but has not tried any conservative measures. Pt states he had a back brace in the past, but he lost it. Pt has not had OPPT for the back, so Dr. Azul ordered OPPT and is sending him to pain management for epidural for L3-S1. Pt had an US this morning that he forgot about and he's trying to get a hold of them. Pt hasn't used AD since after his L tibia fx in 2016. Pt reports tingling down the R LE and dragging the foot if he walks too long.     RMD in 6-8 wks after PT & injex. Pt does have hx ACDF 3 yrs ago. Pt states it still clicks and causes stiffness with some limited movement, but no reports of pain.     Denies hx: pacemaker, CA, CVA, seizures, MI, DM, latex allergies    Pain: 5/10 current, 3/10 at best, 10/10 at worst    Aggravating/functional factors: standing prolonged, walking, bending, twisting,  lifting/carrying (some of this is limited from the L shoulder which was replaced  or ), stairs  reciprocal with rail unless in a lot of pain, rising, occasionally sleeping, yard work more limited with the shoulder, washing/dressing/grooming    PLOF: prior issues with the above functional activities over the last 15 yrs or so    Relieving factors: OTC meds, sitting still    Social Hx: lives with spouse and 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 and has had multi medical issues with occasional assist wife for transfers; both are retired; listen to music, takes pictures, likes to people watch; stairs at home with rails    Quality of life: good    Pain  Quality: radiating  Progression: worsening    Hand dominance: ambidextrous    Treatments  Previous treatment: physical therapy and injection treatment (injex in the L shld before surg)  Patient Goals  Patient goals for therapy: decreased pain, increased strength and increased motion  Patient goal: improve gait; ride bike without difficulty         Past Medical History:   Diagnosis Date    Arthritis     Asthma     Cervical disc disorder     Glaucoma     Hypertension     Injury of neck    Pt notes headaches on intake form as well.     Past Surgical History:   Procedure Laterality Date    ANTERIOR CERVICAL DISCECTOMY W/ FUSION N/A 2021    Procedure: CERVICAL DISCECTOMY ANTERIOR FUSION WITH INSTRUMENTATION C4-7;  Surgeon: Juan Manuel Azul IV, MD;  Location: [facility] MAIN OR;  Service: Neurosurgery;  Laterality: N/A;    HAND SURGERY      SHOULDER ARTHROSCOPY      TIBIA FRACTURE SURGERY      TOE SURGERY         Objective          Active Range of Motion     Lumbar   Flexion: WFL  Left rotation: WFL  Right rotation: WFL  Left Hip   Flexion: WFL  External rotation (90/90): 40 degrees   Internal rotation (90/90): 35 degrees     Right Hip   Flexion: WFL  External rotation (90/90): 40 degrees   Internal rotation (90/90): 30 degrees   Left Knee   Extensor la degrees     Right Knee   Extensor la degrees     Additional Active Range of Motion Details  Flex hands to  floor with mild knee bend  Reps flex increased pain R LB  Rot L increases pulling on R  SB 20 degrees B     Strength/Myotome Testing     Left Hip   Planes of Motion   Flexion: 4-  Abduction: 4    Right Hip   Planes of Motion   Flexion: 4-  Abduction: 4    Left Knee   Flexion: 4  Extension: 4+    Right Knee   Flexion: 4  Extension: 4-    Left Ankle/Foot   Dorsiflexion: 4  Inversion: 4+  Eversion: 4+  Great toe flexion: 4+  Great toe extension: 4+    Right Ankle/Foot   Dorsiflexion: 4  Inversion: 4+  Eversion: 4  Great toe flexion: 4  Great toe extension: 4    Tests     Lumbar     Left   Negative femoral stretch and vertical compression.     Right   Positive femoral stretch.   Negative vertical compression.     Left Hip   Positive Ely's and JASMINA.   90/90 SLR: Positive.   SLR: Negative.     Right Hip   Positive Ely's and JASMINA.   90/90 SLR: Positive.   SLR: Negative.       Palpation: TTP @ R L/S regions; soft tissue lump size of quarter on L mid thigh; scars L lower leg from prior fx surg (hardware still in place)     Sensation: intact/equal to LT B LE except L L3 vs R L3 from prior fxs     Posture: head fwd/rounded shoulders; asymmetric shoulders; reduced lumb lordosis    DTRs: 2+ B LEs    Clonus: (-)    Gait: I without AD; reduced gt speed, circumducted gait R     Balance: appears fair/good with walking and standing ROM     Transfers: I sit to/stand with UE prn; I supine to/from stand    Flexibility: mod/significantly limited tight hams, min/mod limited hip rotations, quads, hip flexors, ITB        Assessment & Plan       Assessment  Impairments: abnormal coordination, abnormal gait, abnormal muscle firing, abnormal muscle tone, abnormal or restricted ROM, activity intolerance, impaired balance, impaired physical strength, lacks appropriate home exercise program, pain with function, safety issue and weight-bearing intolerance   Assessment details: The patient is a 69 y.o. male who presents to physical therapy today  for Lumbar stenosis with neurogenic claudication. PT added SI dysfct, B leg weakness, difficulty walking and gait abnormality. Upon initial evaluation, the patient demonstrates the above & following impairments: pain, reduced posture, SI/IS dysfunction, decreased ROM/flexibility, strength, gait, balance and function. Due to these impairments, the patient is unable to/limited with: standing prolonged, walking, bending, twisting,  lifting/carrying (some of this is limited from the L shoulder which was replaced 2010 or 2011), stairs reciprocal with rail unless in a lot of pain, rising, occasionally sleeping, yard work more limited with the shoulder/some due to LB and washing/dressing/grooming. The patient would benefit from skilled PT services to address functional limitations and impairments and to improve patient quality of life.      Barriers to therapy: arthritis, asthma, cerv issues/fusion, and HTN could affect PT Rx/progress/outcomes if exacerbated/unregulated which could affect tolerance to PT/exs  Prognosis: good    Goals  Plan Goals: STGs in 4 weeks:  Decrease pain to 5-6/10 on average  Increase trunk/LE ROM by 10 degrees where limited as much  Increase LE strength to 4/5    LTGs by discharge  Increase trunk/LE ROM to WFL/WNL  Increase LE strength to 5/5   Pt will be able to ascend/descend stairs reciprocally without use of rail(s) and with minimal difficulty or pain  Pt will be able to stand 30-60 mins for basic ADLs without difficulty or pain  Pt will be able to walk 30-60 mins for ADLs/grocery shopping without difficulty, pain or LOB  Pt will be able to wash/dress/groom without difficulty or increased pain  Pt will be able to lift/carry laundry baskets, pots/pans, garbage or grocery bags with min difficulty or increased pain  Pt will be able to sleep full nights most nights without waking from LBP/LE symptoms      Plan  Therapy options: will be seen for skilled therapy services  Planned modality  interventions: cryotherapy, thermotherapy (hydrocollator packs), electrical stimulation/Italian stimulation, ultrasound, traction and TENS  Planned therapy interventions: manual therapy, neuromuscular re-education, postural training, soft tissue mobilization, spinal/joint mobilization, strengthening, stretching, therapeutic activities, transfer training, abdominal trunk stabilization, ADL retraining, body mechanics training, home exercise program, gait training, functional ROM exercises, flexibility, motor coordination training, balance/weight-bearing training and joint mobilization  Frequency: 2x week  Duration in weeks: 13  Treatment plan discussed with: patient      History # of Personal Factors and/or Comorbidities: HIGH (3+)  Examination of Body System(s): # of elements: HIGH (4+)  Clinical Presentation: UNSTABLE  variable pain, high pain levels, comorbidity, chronic issue  Clinical Decision Making: HIGH      Timed:         Manual Therapy:         mins  73882;     Therapeutic Exercise:   13      mins  88451;     Neuromuscular Tono:        mins  40451;    Therapeutic Activity:          mins  04461;     Gait Training:           mins  73895;     Ultrasound:          mins  48151;    Ionto                                   mins   39137  Self Care                            mins   76900      Un-Timed:  Electrical Stimulation:         mins  86567 ( );  Canalith Repos                   mins  17864  Dry Needle 1-2 ms      ___  mins 96588  Dry Needle  3+ ms              mins 95357  Traction          mins 11645  Low Eval          Mins  35465  Mod Eval          Mins  02666  High Eval                       49     Mins  18443  Re-Eval                               mins  89571        Timed Treatment:   13   mins   Total Treatment:     62   mins            PT SIGNATURE: Megan Leiva PT   [place] PT Lic# [id number]  DATE TREATMENT INITIATED: 9/30/2024    Initial Certification  Certification Period: 9/30/2024 through  12/28/2024  I certify that the therapy services are furnished while this patient is under my care.  The services outlined above are required by this patient, and will be reviewed every 90 days.         Physician Signature: _________________________  PHYSICIAN: Juan Manuel Azul IV, MD   NPI: [id number]                                             DATE: _____________________________________    Please sign and return via fax to [phone]. Thank you, [facility].

## 2024-10-02 ENCOUNTER — TREATMENT (OUTPATIENT)
Dept: PHYSICAL THERAPY | Facility: CLINIC | Age: 69
End: 2024-10-02
Payer: OTHER GOVERNMENT

## 2024-10-02 DIAGNOSIS — R26.9 GAIT ABNORMALITY: ICD-10-CM

## 2024-10-02 DIAGNOSIS — M53.3 SI (SACROILIAC) JOINT DYSFUNCTION: ICD-10-CM

## 2024-10-02 DIAGNOSIS — M48.062 LUMBAR STENOSIS WITH NEUROGENIC CLAUDICATION: Primary | ICD-10-CM

## 2024-10-02 DIAGNOSIS — R26.2 DIFFICULTY WALKING: ICD-10-CM

## 2024-10-02 DIAGNOSIS — R29.898 LEG WEAKNESS, BILATERAL: ICD-10-CM

## 2024-10-02 NOTE — PROGRESS NOTES
Physical Therapy Treatment Note  [id number] [place] [address]      Patient: Justo Finnegan   : 1955  Diagnosis/ICD-10 Code:  Lumbar stenosis with neurogenic claudication [M48.062]  Referring practitioner: Juan Manuel Azul IV, MD  Date of Initial Visit: Type: THERAPY  Noted: 2024  Today's Date: 10/2/2024  Patient seen for 2 sessions           Visit Diagnoses:     ICD-10-CM ICD-9-CM   1. Lumbar stenosis with neurogenic claudication  M48.062 724.03   2. SI (sacroiliac) joint dysfunction  M53.3 724.6   3. Leg weakness, bilateral  R29.898 729.89   4. Difficulty walking  R26.2 719.7   5. Gait abnormality  R26.9 781.2       Subjective Justo Finnegan reports: pain is 8/10 this morning at the R LB and into the R LE.     Objective   Palpation: R on L in lumb ext & L on L in lumb flex    See Exercise, Manual, and Modality Logs for complete treatment.     Patient Education: cues for therex/NMR per flow sheet    Assessment/Plan  Manual had good affect on LBP and with improved pelvic/sacral alignment. Pt was getting sore laying on his L side due to his shoulder issues, so finished manual in supine then seated for sacrum. Pt declined modalities and reported less pain with standing and walking. [employer]berry band was issued for HEP.       Progress per Plan of Care and Progress strengthening /stabilization /functional activity          Timed:         Manual Therapy:   13      mins  91654;     Therapeutic Exercise:   13      mins  44914;     Neuromuscular Tono:  18      mins  02333;    Therapeutic Activity:          mins  05685;     Gait Training:           mins  89804;     Ultrasound:          mins  77260;    Ionto                                   mins   89368  Self Care                            mins   39585    Un-Timed:  Electrical Stimulation:         mins  44046 ( );  Traction          mins 32084  Canalith Repos                   mins  71431  Dry Needle 1-2 ms      ___  mins  38328  Dry Needle  3+ ms              mins 43627  Low Eval          mins  93727  Mod Eval          Mins  88517  High Eval                            Mins  78975  Re-Eval                               mins  79113    Timed Treatment:   44   mins   Total Treatment:     44    mins          Megan Leiva, PT    Physical Therapist

## 2024-10-09 ENCOUNTER — TREATMENT (OUTPATIENT)
Dept: PHYSICAL THERAPY | Facility: CLINIC | Age: 69
End: 2024-10-09
Payer: OTHER GOVERNMENT

## 2024-10-09 DIAGNOSIS — R26.9 GAIT ABNORMALITY: ICD-10-CM

## 2024-10-09 DIAGNOSIS — R29.898 LEG WEAKNESS, BILATERAL: ICD-10-CM

## 2024-10-09 DIAGNOSIS — M48.062 LUMBAR STENOSIS WITH NEUROGENIC CLAUDICATION: Primary | ICD-10-CM

## 2024-10-09 DIAGNOSIS — M53.3 SI (SACROILIAC) JOINT DYSFUNCTION: ICD-10-CM

## 2024-10-09 DIAGNOSIS — R26.2 DIFFICULTY WALKING: ICD-10-CM
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
## 2024-10-14 ENCOUNTER — TELEPHONE (OUTPATIENT)
Dept: PHYSICAL THERAPY | Facility: CLINIC | Age: 69
End: 2024-10-14

## 2024-10-16 ENCOUNTER — TELEPHONE (OUTPATIENT)
Dept: ORTHOPEDICS | Facility: OTHER | Age: 69
End: 2024-10-16
Payer: OTHER GOVERNMENT

## 2024-10-28 ENCOUNTER — TELEPHONE (OUTPATIENT)
Dept: PHYSICAL THERAPY | Facility: OTHER | Age: 69
End: 2024-10-28
Payer: OTHER GOVERNMENT

## 2024-10-28 NOTE — TELEPHONE ENCOUNTER
"  Caller: Justo Finnegan \"Shahid\"    Relationship: Self    What was the call regarding: PATIENT CANCELLED TODAYS APPT DUE TO BEING OUT OF STATE      "

## 2024-12-04 ENCOUNTER — TELEPHONE (OUTPATIENT)
Dept: NEUROSURGERY | Facility: CLINIC | Age: 69
End: 2024-12-04
Payer: OTHER GOVERNMENT

## 2024-12-04 NOTE — TELEPHONE ENCOUNTER
Called and LVM to call the office, we need to see if he has completed his DAPHNIE that was ordered at his last OV and see if he plans to continue PT? Per chart review he has only completed 3 sessions of PT. These will need to be completed prior to his fup with Dr. Azul.

## 2024-12-04 NOTE — TELEPHONE ENCOUNTER
Called patient and gave him the message about needing to cancel his appointment for Friday and he stated that he really needs to talk to someone as no one called him to get the injection and that he stopped pt as it did not help.I let him know that I needed to cancel the appointment until we can get the injection and that I can have someone call him back about what we figure out. He stated he understood.

## 2025-01-08 ENCOUNTER — DOCUMENTATION (OUTPATIENT)
Dept: PHYSICAL THERAPY | Facility: CLINIC | Age: 70
End: 2025-01-08
Payer: OTHER GOVERNMENT

## 2025-01-08 NOTE — PROGRESS NOTES
Physical Therapy Discharge Summary  [address] [place] [address]    Patient: Justo Finnegan   : 1955  Diagnosis/ICD-10 Code:  Lumbar stenosis with neurogenic claudication [M48.062]  Referring practitioner: Juan Manuel Azul IV, MD  Date of Initial Visit: Type: THERAPY  Noted: 2024  Last Visit Date: 10/9/2024  Patient seen for 3 sessions    Discharge Status of Patient: pt was going out of state. Pt never called back to reschedule and never returned to PT.      Goals: Goal status is undetermined as pt never returned to PT after the 3rd session.      Discharge Plan: No specific D/C instructions were given as pt never returned to PT after the 3rd session.      Comments: Pt was given HEP during sessions.      Date of Discharge: 25            Megan Leiva, PT  Physical Therapist  [place] License: [id number]

## (undated) DEVICE — SHEET, DRAPE, SPLIT, STERILE: Brand: MEDLINE

## (undated) DEVICE — SOLUTION,WATER,IRRIGATION,1000ML,STERILE: Brand: MEDLINE

## (undated) DEVICE — DRP C/ARMOR

## (undated) DEVICE — DRP OPMI 326071

## (undated) DEVICE — SUT MONOCRYL 4/0 PS2 27IN Y426H ETY426H

## (undated) DEVICE — INTENDED FOR TISSUE SEPARATION, AND OTHER PROCEDURES THAT REQUIRE A SHARP SURGICAL BLADE TO PUNCTURE OR CUT.: Brand: BARD-PARKER ® CARBON RIB-BACK BLADES

## (undated) DEVICE — DECANTER: Brand: UNBRANDED

## (undated) DEVICE — RESTRNT LIMB FOAM 2STRAP

## (undated) DEVICE — GLV SURG BIOGEL LTX PF 8

## (undated) DEVICE — Device

## (undated) DEVICE — UNDERGLV SURG BIOGEL INDICAT PI SZ8 BLU

## (undated) DEVICE — SLV SCD CALF HEMOFORCE DVT THERP REPROC MD

## (undated) DEVICE — PK BASIC SPINE 50

## (undated) DEVICE — SOL IRRIG NACL 1000ML

## (undated) DEVICE — SUT VIC 3/0 SH CR8 18IN J864D

## (undated) DEVICE — KT SURG TURNOVER 050

## (undated) DEVICE — CODMAN® SURGICAL PATTIES 1" X 1" (2.54CM X 2.54CM): Brand: CODMAN®

## (undated) DEVICE — 3.0MM PRECISION NEURO (MATCH HEAD)

## (undated) DEVICE — KT DRN EVAC WND PVC PCH WTROC RND 10F400

## (undated) DEVICE — DISTRACT SCRW 12MM STRL